# Patient Record
Sex: FEMALE | Race: OTHER | Employment: UNEMPLOYED | URBAN - METROPOLITAN AREA
[De-identification: names, ages, dates, MRNs, and addresses within clinical notes are randomized per-mention and may not be internally consistent; named-entity substitution may affect disease eponyms.]

---

## 2024-03-13 ENCOUNTER — TELEPHONE (OUTPATIENT)
Age: 29
End: 2024-03-13

## 2024-03-13 LAB
ABO, EXTERNAL RESULT: NORMAL
HEPATITIS C ANTIBODY, EXTERNAL RESULT: NORMAL
RH FACTOR, EXTERNAL RESULT: POSITIVE
RUBELLA TITER, EXTERNAL RESULT: NORMAL

## 2024-03-13 NOTE — TELEPHONE ENCOUNTER
Received an referral to have to pt seen at [facility] at 20 weeks. I scheduled pt on 05/23/2024 @ 8:00. Called pt via  to confirm appointment and she did not answer, no vm could be left bc it stated that the number was \"not in service\". We called the boyfriend and we could not get I contact with him either. The patient does not have an active mychart.  Called Dr Tierney's office to inform them that I was not able to get in contact with the patient. I called Dr Tierney's office and spoke to Jerry, she stated that we had the same number and she would note to inform the pt about over appointment when she comes in to see them in April.

## 2024-04-10 ENCOUNTER — ROUTINE PRENATAL (OUTPATIENT)
Age: 29
End: 2024-04-10
Payer: COMMERCIAL

## 2024-04-10 ENCOUNTER — OFFICE VISIT (OUTPATIENT)
Age: 29
End: 2024-04-10

## 2024-04-10 VITALS
DIASTOLIC BLOOD PRESSURE: 60 MMHG | SYSTOLIC BLOOD PRESSURE: 94 MMHG | HEIGHT: 59 IN | BODY MASS INDEX: 33.14 KG/M2 | WEIGHT: 164.4 LBS | TEMPERATURE: 98.2 F | OXYGEN SATURATION: 98 % | HEART RATE: 85 BPM

## 2024-04-10 DIAGNOSIS — F32.A DEPRESSION, UNSPECIFIED DEPRESSION TYPE: ICD-10-CM

## 2024-04-10 DIAGNOSIS — Z3A.13 13 WEEKS GESTATION OF PREGNANCY: Primary | ICD-10-CM

## 2024-04-10 DIAGNOSIS — Z13.31 POSITIVE DEPRESSION SCREENING: Primary | ICD-10-CM

## 2024-04-10 DIAGNOSIS — R30.0 DYSURIA: ICD-10-CM

## 2024-04-10 LAB
BILIRUBIN, URINE, POC: NEGATIVE
BLOOD URINE, POC: NORMAL
GLUCOSE URINE, POC: NEGATIVE
KETONES, URINE, POC: NEGATIVE
LEUKOCYTE ESTERASE, URINE, POC: NORMAL
NITRITE, URINE, POC: NEGATIVE
PH, URINE, POC: 7 (ref 4.6–8)
PROTEIN,URINE, POC: NEGATIVE
SPECIFIC GRAVITY, URINE, POC: 1.02 (ref 1–1.03)
URINALYSIS CLARITY, POC: NORMAL
URINALYSIS COLOR, POC: YELLOW
UROBILINOGEN, POC: NORMAL

## 2024-04-10 PROCEDURE — 81003 URINALYSIS AUTO W/O SCOPE: CPT

## 2024-04-10 RX ORDER — ASPIRIN 81 MG/1
81 TABLET ORAL DAILY
Qty: 90 TABLET | Refills: 0 | Status: SHIPPED | OUTPATIENT
Start: 2024-04-10

## 2024-04-10 ASSESSMENT — PATIENT HEALTH QUESTIONNAIRE - PHQ9
SUM OF ALL RESPONSES TO PHQ QUESTIONS 1-9: 24
SUM OF ALL RESPONSES TO PHQ QUESTIONS 1-9: 21
6. FEELING BAD ABOUT YOURSELF - OR THAT YOU ARE A FAILURE OR HAVE LET YOURSELF OR YOUR FAMILY DOWN: NEARLY EVERY DAY
SUM OF ALL RESPONSES TO PHQ9 QUESTIONS 1 & 2: 6
5. POOR APPETITE OR OVEREATING: NEARLY EVERY DAY
SUM OF ALL RESPONSES TO PHQ QUESTIONS 1-9: 24
1. LITTLE INTEREST OR PLEASURE IN DOING THINGS: NEARLY EVERY DAY
10. IF YOU CHECKED OFF ANY PROBLEMS, HOW DIFFICULT HAVE THESE PROBLEMS MADE IT FOR YOU TO DO YOUR WORK, TAKE CARE OF THINGS AT HOME, OR GET ALONG WITH OTHER PEOPLE: EXTREMELY DIFFICULT
7. TROUBLE CONCENTRATING ON THINGS, SUCH AS READING THE NEWSPAPER OR WATCHING TELEVISION: NEARLY EVERY DAY
4. FEELING TIRED OR HAVING LITTLE ENERGY: NEARLY EVERY DAY
SUM OF ALL RESPONSES TO PHQ QUESTIONS 1-9: 24
2. FEELING DOWN, DEPRESSED OR HOPELESS: NEARLY EVERY DAY
9. THOUGHTS THAT YOU WOULD BE BETTER OFF DEAD, OR OF HURTING YOURSELF: NEARLY EVERY DAY
8. MOVING OR SPEAKING SO SLOWLY THAT OTHER PEOPLE COULD HAVE NOTICED. OR THE OPPOSITE, BEING SO FIGETY OR RESTLESS THAT YOU HAVE BEEN MOVING AROUND A LOT MORE THAN USUAL: NEARLY EVERY DAY

## 2024-04-10 NOTE — PROGRESS NOTES
I discussed with the resident the medical history and the resident's findings on physical exam. I discussed with the resident the patient's diagnosis and agree with the plan of care.     Remote hx of spotting - pink with wiping.  Has resolved/not having today.      27yo  at 13w6d by --/    IUP: RH pos, MFM scheduled   Obesity: A1C and 1hr GT, ASA  Depression: starting Zoloft, giving psych resources including Crisis hotline   Hx spotting: will check UA    Meet with CM/SW for IOB  Estimated Date of Delivery: None noted.    
Jaylene Tobin is a 28 y.o. female      Chief Complaint   Patient presents with    Routine Prenatal Visit     13w 6d.  2 weeks ago vaginal bleeding and pain which has resolved. Pain in lower belly and hips but no bleeding for 1 week.  Discharge describing as like \"beginning of period\".  Headaches and stressed and concerned about depression.       \"Have you been to the ER, urgent care clinic since your last visit?  Hospitalized since your last visit?\"    NO    “Have you seen or consulted any other health care providers outside of [facility] since your last visit?”    NO            Click Here for Release of Records Request    Vitals:    04/10/24 0920   BP: 94/60   Site: Right Upper Arm   Position: Sitting   Cuff Size: Medium Adult   Pulse: 85   Temp: 98.2 °F (36.8 °C)   TempSrc: Oral   SpO2: 98%   Weight: 74.6 kg (164 lb 6.4 oz)   Height: 1.5 m (4' 11.06\")           Medication Reconciliation Completed, changes notes. Please Update medication list.  
patient has received an after-visit summary and questions were answered concerning future plans.  I have discussed medication side effects and warnings with the patient as well. Informed pt to return to the office or go to the ER if she experiences vaginal bleeding, vaginal discharge, leaking of fluid, pelvic cramping.    Pt discussed with Dr. Jaramillo (attending physician)    Jaswant Jimenes MD  Family Medicine Resident

## 2024-04-10 NOTE — PROGRESS NOTES
SW follow-up visit. Patient with continued depression. Hx of depression about 1 year ago, severe depression, was seeing a therapist in [place] and placed on medication (patient does not recollect name), per patient medication did not help much. Patient states no desire to do anything, no strength, nervous, phillips, and angry all the time.     SW Navigator reviewed some coping techniques with patient including deep breathing exercises and mindfulness techniques. Handout provided during visit of this resource.     Provided a list of behavioral health resources compiled by this  navigator, including emergency numbers, suicide hotline numbers, and local providers for psychiatry and counseling.     Depression re-screen:  PHQ2 = 6  PHQ9 = 24    Behavioral Health Focused Assessment  Any symptoms of Anxiety:  Chest pain? yes  Shortness of breath? yes  Racing pulse/Increased heart rate? yes  Any symptoms of Depression:  Suicidal thoughts or feelings of hurting yourself? yes  Feelings of hopelessness? yes  Increased fatigue or loss of energy? yes  Loss of appetite? yes  Sleeping too much? Sometimes it varies by day  Possible Manic Symptoms:  Racing thoughts? yes  Impulsivity, like buying sprees? no  Unusual talkativeness? no  Decreased need for sleep? no  Increased energy? no  Other BH Symptoms:  Decreased ability to think or concentrate? yes  If yes, is this affecting your daily activities? yes  Auditory and Visual hallucinations? no  Delusional thinking? no  Homicidal thoughts or aggressive thoughts/actions towards others, no  Any issues with alcohol or drug abuse? no    Assessment:  Positive depression screening    Plan:    Continue with physician recommendation, schedule appointment with counselor/therapist (list provided), and ongoing psychosocial support by SW and resource referral, as desired by the patient.       VIVIAN Alfaro Navigator

## 2024-04-10 NOTE — PATIENT INSTRUCTIONS
Patient Education        Recuperación de la depresión: Instrucciones de cuidado  Recovering From Depression: Care Instructions  Instrucciones de cuidado     Tener un buen cuidado de usted mismo es importante a medida que se recupera de la depresión. Con el tiempo, a medida que el tratamiento funcione kathleen síntomas desaparecerán. No lo abandone. Al contrario, concentre de la torre energía en recuperarse.  De [place] estado de ánimo mejorará. Solo tomará un tiempo. Concéntrese en cosas que le pueden ayudar a sentirse mejor, lupe estar con amigos o familiares, comer jose cruz y descansar lo suficiente. Sada tómese las cosas con tranquilidad. No maria a muchas actividades demasiado pronto. Comenzará a sentirse mejor poco a poco.  La atención de seguimiento es mena parte clave de de la torre tratamiento y seguridad. Asegúrese de hacer y acudir a todas las citas, y llame a de la torre médico si está teniendo problemas. También es mena buena idea saber los resultados de kathleen exámenes y mantener mena lista de los medicamentos que bonnie.  ¿Cómo puede cuidarse en el hogar?  Sea realista  Si debe hacer mena tarea que le llevará mucho tiempo, divídala en varias etapas pequeñas que usted pueda manejar y maria a solo lo que pueda.  Es posible que quiera posponer las decisiones importantes hasta que se haya recuperado de la depresión. Si tiene planes que tendrán un gran impacto en de la torre nadja, lupe casarse, divorciarse o cambiar de trabajo, intente esperar un poco. Háblelo con kathleen amigos y seres queridos, quienes pueden ayudarle a analizar el panorama completo.  Es importante acercarse a las personas para pedirles ayuda. No se aísle. Deje que de la torre tao y amigos le ayuden. Encuentre a alguien en quien pueda confiar y hable con monster persona.  Sea paciente y bondadoso consigo mismo. Recuerde que la depresión no es de la torre culpa y no es algo que usted pueda superar solo con fuerza de voluntad. El tratamiento es importante para la depresión, al igual que para cualquier otra enfermedad. Sentirse

## 2024-04-17 LAB
Lab: NEGATIVE
Lab: NORMAL
NTRA ALPHA-THALASSEMIA: NEGATIVE
NTRA BETA-HEMOGLOBINOPATHIES: NEGATIVE
NTRA CANAVAN DISEASE: NEGATIVE
NTRA CYSTIC FIBROSIS: NEGATIVE
NTRA DUCHENNE/BECKER MUSCULAR DYSTROPHY: NEGATIVE
NTRA FAMILIAL DYSAUTONOMIA: NEGATIVE
NTRA FRAGILE X SYNDROME: NEGATIVE
NTRA GALACTOSEMIA: NEGATIVE
NTRA GAUCHER DISEASE: NEGATIVE
NTRA MEDIUM CHAIN ACYL-COA DEHYDROGENASE DEFICIENCY: NEGATIVE
NTRA POLYCYSTIC KIDNEY DISEASE, AUTOSOMAL RECESSIVE: NEGATIVE
NTRA SMITH-LEMLI-OPITZ SYNDROME: NEGATIVE
NTRA SPINAL MUSCULAR ATROPHY: NEGATIVE
NTRA TAY-SACHS DISEASE: NEGATIVE

## 2024-05-08 ENCOUNTER — ROUTINE PRENATAL (OUTPATIENT)
Age: 29
End: 2024-05-08
Payer: COMMERCIAL

## 2024-05-08 VITALS
DIASTOLIC BLOOD PRESSURE: 71 MMHG | WEIGHT: 167.2 LBS | OXYGEN SATURATION: 97 % | BODY MASS INDEX: 33.71 KG/M2 | HEIGHT: 59 IN | TEMPERATURE: 98 F | HEART RATE: 90 BPM | SYSTOLIC BLOOD PRESSURE: 107 MMHG | RESPIRATION RATE: 18 BRPM

## 2024-05-08 DIAGNOSIS — Z34.90 PREGNANCY, UNSPECIFIED GESTATIONAL AGE: Primary | ICD-10-CM

## 2024-05-08 PROCEDURE — 0502F SUBSEQUENT PRENATAL CARE: CPT

## 2024-05-08 PROCEDURE — 90746 HEPB VACCINE 3 DOSE ADULT IM: CPT

## 2024-05-08 ASSESSMENT — PATIENT HEALTH QUESTIONNAIRE - PHQ9
2. FEELING DOWN, DEPRESSED OR HOPELESS: NOT AT ALL
SUM OF ALL RESPONSES TO PHQ9 QUESTIONS 1 & 2: 0
SUM OF ALL RESPONSES TO PHQ QUESTIONS 1-9: 0
SUM OF ALL RESPONSES TO PHQ QUESTIONS 1-9: 0
1. LITTLE INTEREST OR PLEASURE IN DOING THINGS: NOT AT ALL
SUM OF ALL RESPONSES TO PHQ QUESTIONS 1-9: 0
SUM OF ALL RESPONSES TO PHQ QUESTIONS 1-9: 0

## 2024-05-08 NOTE — PROGRESS NOTES
Chief Complaint   Patient presents with    Routine Prenatal Visit     Vaginal bleeding: no  Vaginal fluid: no  Contractions: no  Concerns: no     Vitals:    05/08/24 0853   BP: 107/71   Site: Right Upper Arm   Position: Sitting   Cuff Size: Medium Adult   Pulse: 90   Resp: 18   Temp: 98 °F (36.7 °C)   TempSrc: Temporal   SpO2: 97%   Weight: 75.8 kg (167 lb 3.2 oz)   Height: 1.5 m (4' 11.06\")     \"Have you been to the ER, urgent care clinic since your last visit?  Hospitalized since your last visit?\"    NO    “Have you seen or consulted any other health care providers outside of [facility] since your last visit?”    NO            Click Here for Release of Records Request

## 2024-05-08 NOTE — PROGRESS NOTES
[address] [place] [address]   Office [phone], Fax [phone]    Return OB Visit     Subjective:    Jaylene Tobin 28 y.o.  at GA: 17w6d , PATTI: Estimated Date of Delivery: 10/10/24 by LMP and confirmed by 9w US.    Doing well with no concerns today. No SI. Doing much better on zoloft. Asking about gender of baby.    Discharge: no  LOF: no  Vaginal bleeding: as above  Contractions: no  Taking prenatal vitamins: yes  Taking ASA:  yes    Allergies- reviewed:   No Known Allergies  Medications- reviewed:   Current Outpatient Medications   Medication Sig    aspirin 81 MG EC tablet Take 1 tablet by mouth daily    sertraline (ZOLOFT) 50 MG tablet Take 1 tablet by mouth daily    Prenatal Vit-Fe Fumarate-FA (PRENATAL VITAMINS) 28-0.8 MG TABS Take by mouth    doxylamine-pyridoxine 10-10 MG TBEC Take 10 mg by mouth daily (Patient not taking: Reported on 3/13/2024)     No current facility-administered medications for this visit.     Past Medical History- reviewed:  No past medical history on file.  Past Surgical History- reviewed:   No past surgical history on file.  Social History- reviewed:  Social History     Socioeconomic History    Marital status: Single     Spouse name: Not on file    Number of children: Not on file    Years of education: Not on file    Highest education level: Not on file   Occupational History    Not on file   Tobacco Use    Smoking status: Never    Smokeless tobacco: Never   Substance and Sexual Activity    Alcohol use: Never    Drug use: Never    Sexual activity: Not on file   Other Topics Concern    Not on file   Social History Narrative    Not on file     Social Determinants of Health     Financial Resource Strain: Patient Declined (3/13/2024)    Overall Financial Resource Strain (CARDIA)     Difficulty of Paying Living Expenses: Patient declined   Recent Concern: Financial Resource Strain - Medium Risk (2024)    Overall Financial Resource Strain (CARDIA)

## 2024-05-08 NOTE — PROGRESS NOTES
I reviewed with the resident the medical history and the resident's findings on the physical examination.  I discussed with the resident the patient's diagnosis and concur with the plan, with the following additions:     28 y.o.   at 17w6d by 9 week US here for KARRIE    BP Readings from Last 3 Encounters:   04/10/24 94/60   24 111/69   24 107/70       Wt Readings from Last 10 Encounters:   04/10/24 74.6 kg (164 lb 6.4 oz)   24 76.9 kg (169 lb 9.6 oz)   24 70.3 kg (155 lb)          #KARRIE:   - Labs reviewed - Rh+ and ab screen neg; Hep B Ag neg and nonimmune; rubella immune; varicella immune; HIV and syphilis NR, GC/Chlaymydia neg; early GTT passed; GTT @ 24-28wks; TSH not done; Ucx no growth; NIPT and Carrier Screening low risk; Hemoglobin evaluation wnl. GBS to be collected within 5 weeks of anticipated delivery.  - Last Pap 3/2024, NILM  - Sonos reviewed. anatomy sono scheduled.   - Immunizations:; Tdap at 28; Hep B indicated  - Indications for Aspirin: obesity, SES  - Taking PNV.  - Anticipated mode of delivery: vaignal  - Contraception plan: TBD    #Depression  - started zoloft last visit and given resources.  - today is doing much better per pt.  - cont Zoloft    #Obesity  - Passed early GTT  - ASA  - counseled healthy weight gain in pregnancy.  - 3rd tri growth.    #HBV NI  - Vaccine 1 of 3 today, and at 1 and 6 mo.      Today: remind of anatomy appt, check in on mood, HBV Vax 1 of 3  Next visit: review anatomy.        Follow up in 4 weeks.  Scheduled for 6/3/2024        30 minutes were spent on the day of this encounter both with the patient and in related activities including chart review, care coordination and counseling.        Kane Briseno MD, MPH  [facility]

## 2024-05-22 ENCOUNTER — TELEPHONE (OUTPATIENT)
Age: 29
End: 2024-05-22

## 2024-05-22 NOTE — TELEPHONE ENCOUNTER
Called pt to confirm appt for 05/23 @ 8:00 via  ID # [id number]. Pt did not answer when the number on file was called and we could not leave a vm. Called boyfriend and he confirmed that the pt will be at her appt on tomorrow

## 2024-05-23 ENCOUNTER — ROUTINE PRENATAL (OUTPATIENT)
Age: 29
End: 2024-05-23
Payer: COMMERCIAL

## 2024-05-23 VITALS — SYSTOLIC BLOOD PRESSURE: 105 MMHG | DIASTOLIC BLOOD PRESSURE: 66 MMHG | HEART RATE: 94 BPM

## 2024-05-23 DIAGNOSIS — E66.9 OBESITY (BMI 30-39.9): ICD-10-CM

## 2024-05-23 DIAGNOSIS — Z3A.20 20 WEEKS GESTATION OF PREGNANCY: ICD-10-CM

## 2024-05-23 DIAGNOSIS — O99.212 OBESITY AFFECTING PREGNANCY IN SECOND TRIMESTER, UNSPECIFIED OBESITY TYPE: Primary | ICD-10-CM

## 2024-05-23 PROCEDURE — 76817 TRANSVAGINAL US OBSTETRIC: CPT | Performed by: OBSTETRICS & GYNECOLOGY

## 2024-05-23 PROCEDURE — 76811 OB US DETAILED SNGL FETUS: CPT | Performed by: OBSTETRICS & GYNECOLOGY

## 2024-05-23 PROCEDURE — 99024 POSTOP FOLLOW-UP VISIT: CPT | Performed by: OBSTETRICS & GYNECOLOGY

## 2024-05-23 NOTE — PROCEDURES
PATIENT: BRIAN CUNHA   -  : 1995   -  DOS:2024   -  INTERPRETING PROVIDER:Paolo Abad,   Indication  ========    Anatomy, Obesity    Method  ======    Transabdominal and transvaginal ultrasound examination. View: Suboptimal view: limited by maternal body habitus and fetal position    Dating  ======    LMP on: 2024  GA by LMP 19 w + 0 d  PATTI by LMP: 10/17/2024  Previous Ultrasound on: 3/13/2024  Type of prior assessment: GA  GA at prior assessment date 9 w + 6 d  GA by previous U/S 20 w + 0 d  PATTI by previous Ultrasound: 10/10/2024  Ultrasound examination on: 2024  GA by U/S based upon: AC, BPD, Femur, HC  GA by U/S 20 w + 1 d  PATTI by U/S: 10/9/2024  Assigned: based on ultrasound (GA), selected on 2024  Assigned GA 20 w + 0 d  Assigned PATTI: 10/10/2024    Fetal Growth Overview  =================    Exam date        GA              BPD (mm)          HC (mm)              AC (mm)            FL (mm)             HL (mm)        EFW (g)  2024        20w 0d        43.3     16%        169.5    23%        159     77%        33.8    64%                             367    80%    Fetal Biometry  ============    Standard  BPD 43.3 mm 19w 1d 16% Hadlock  OFD 63.0 mm 21w 4d 92% Arlene  .5 mm 19w 4d 23% Hadlock  Cerebellum tr 20.8 mm 19w 6d 70% Hill  Nuchal fold 5.3 mm  .0 mm 21w 0d 77% Hadlock  Femur 33.8 mm 20w 4d 64% Hadlock   g 20w 4d 80% Hadlock  EFW (lb) 0 lb  EFW (oz) 13 oz  EFW by: Hadlock (BPD-HC-AC-FL)  Extended   5.7 mm  CM 5.1 mm  55% Nicolaides  Nasal bone 6.7 mm  Head / Face / Neck  Nasal bone: present  Other Structures   bpm    General Evaluation  ==============    Cardiac activity present.  bpm. Fetal movements: visualized. Presentation: BREECH  Placenta: Placental site: posterior, appropriate distance from the internal os. Placental edge-to-cervical os distance 2.7 cm  Umbilical cord: Cord vessels: 3 vessel cord. Insertion site:

## 2024-06-03 ENCOUNTER — ROUTINE PRENATAL (OUTPATIENT)
Age: 29
End: 2024-06-03

## 2024-06-03 ENCOUNTER — OFFICE VISIT (OUTPATIENT)
Age: 29
End: 2024-06-03

## 2024-06-03 VITALS
WEIGHT: 175.8 LBS | DIASTOLIC BLOOD PRESSURE: 62 MMHG | HEART RATE: 86 BPM | TEMPERATURE: 98.2 F | BODY MASS INDEX: 35.44 KG/M2 | OXYGEN SATURATION: 97 % | SYSTOLIC BLOOD PRESSURE: 108 MMHG

## 2024-06-03 DIAGNOSIS — F32.A DEPRESSION, UNSPECIFIED DEPRESSION TYPE: ICD-10-CM

## 2024-06-03 DIAGNOSIS — Z3A.21 21 WEEKS GESTATION OF PREGNANCY: Primary | ICD-10-CM

## 2024-06-03 DIAGNOSIS — Z78.9 NEED FOR FOLLOW-UP BY SOCIAL WORKER: Primary | ICD-10-CM

## 2024-06-03 PROCEDURE — 0502F SUBSEQUENT PRENATAL CARE: CPT

## 2024-06-03 RX ORDER — ASPIRIN 81 MG/1
81 TABLET ORAL DAILY
Qty: 90 TABLET | Refills: 0 | Status: SHIPPED | OUTPATIENT
Start: 2024-06-03

## 2024-06-03 NOTE — PROGRESS NOTES
I discussed with the resident the medical history and the resident's findings on physical exam. I discussed with the resident the patient's diagnosis and agree with the plan of care.     29yo  at 21w4d by --/9    IUP: RH pos  low risk NIPT, Horizon 14 neg  anatomy okay but suboptimal views, f/up scheduled    Obesity: A1C 4.9, early GTT okay  on ASA   Depression/Anxiety: increase to Zoloft 100mg daily  has met with SHAHEEN/ANJANA, encouraged to reach out to therapist   Hx spotting: resolved   Hep B Non-Immune:      Estimated Date of Delivery: 10/10/24

## 2024-06-03 NOTE — PROGRESS NOTES
Patient seen by SHAHEEN Navigator for follow-up visit.    Patient with  depression/anxiety. Patient currently in her 21 weeks of pregnancy. She reports that she is still having difficulty. Re screened today by physician; PHQ9=18 today, higher than previous screening (score of 10). Medication dosage has been increased.    Patient has agreed to have counseling; care coordination established by SHAHEEN. Appointment scheduled for counseling services with [facility]. Scheduled for 24 at 12pm.    Patient also reports some difficulty obtaining food resources in community. Per patient has tried calling but no response. SW reviewed resources including food bank dates and location of distribution.     Patient had questions about her medical bill. SW reviewed bill in question, billing office sent claim to Medicaid for payment. No balance for patient at this time.    Plan:  Ongoing psychosocial support and resource referral, as desired by the patient and family.      VIVIAN Alfaro   Navigator

## 2024-06-03 NOTE — PROGRESS NOTES
Return OB Visit       Subjective:   Jaylene Tobin 28 y.o.   PATTI: 10/10/2024, by Ultrasound  GA:  21w4d.      LOF: No  Vaginal bleeding: No  Fetal movement (after 20 weeks): Unsure if she feels baby move, does feel an occasional pulse  Contractions: No  Prenatal vitamins: Yes    Pt denies fever, chills, HA, vision disturbances, RUQ pain, chest pain, SOB, N/V, urinary problems, foul smelling vaginal discharge.    Allergies- reviewed:   No Known Allergies  Medications- reviewed:   Current Outpatient Medications   Medication Sig    aspirin 81 MG EC tablet Take 1 tablet by mouth daily    sertraline (ZOLOFT) 50 MG tablet Take 2 tablets by mouth daily    Prenatal Vit-Fe Fumarate-FA (PRENATAL VITAMINS) 28-0.8 MG TABS Take by mouth    doxylamine-pyridoxine 10-10 MG TBEC Take 10 mg by mouth daily (Patient not taking: Reported on 3/13/2024)     No current facility-administered medications for this visit.     Past Medical History- reviewed:  No past medical history on file.  Past Surgical History- reviewed:   No past surgical history on file.  Social History- reviewed:  Social History     Socioeconomic History    Marital status: Single     Spouse name: Not on file    Number of children: Not on file    Years of education: Not on file    Highest education level: Not on file   Occupational History    Not on file   Tobacco Use    Smoking status: Never    Smokeless tobacco: Never   Substance and Sexual Activity    Alcohol use: Never    Drug use: Never    Sexual activity: Not on file   Other Topics Concern    Not on file   Social History Narrative    Not on file     Social Determinants of Health     Financial Resource Strain: Patient Declined (3/13/2024)    Overall Financial Resource Strain (CARDIA)     Difficulty of Paying Living Expenses: Patient declined   Recent Concern: Financial Resource Strain - Medium Risk (2024)    Overall Financial Resource Strain (CARDIA)     Difficulty of Paying Living Expenses: Somewhat

## 2024-06-03 NOTE — PROGRESS NOTES
Patient is 21w4d    Taking prenatal vitamins: yes    Leakage of fluid: no  Vaginal bleeding: no  Feeling baby move if over 20 weeks: yes  Contractions: no  Pain: 6/10    Identified pt with two pt identifiers(name and ). Reviewed record in preparation for visit and have obtained necessary documentation.  Chief Complaint   Patient presents with    Routine Prenatal Visit     C/o lower back , vaginal pain, abdominal cramps         Vitals:    24 0819   BP: 108/62   Pulse: 86   Temp: 98.2 °F (36.8 °C)   TempSrc: Oral   SpO2: 97%   Weight: 79.7 kg (175 lb 12.8 oz)         Coordination of Care Questionnaire:  :     \"Have you been to the ER, urgent care clinic since your last visit?  Hospitalized since your last visit?\"    NO    “Have you seen or consulted any other health care providers outside of [facility] since your last visit?”    NO            Click Here for Release of Records Request '

## 2024-06-19 NOTE — PROGRESS NOTES
[address] [place] [address]   Office [phone], Fax [phone]    Return OB Visit       Subjective:   Jaylene Tobin 28 y.o.  at GA:  23w6d, PATTI: Estimated Date of Delivery: 10/10/24 by LMP and confirmed by 9w US.    Patient reports feeling well. No new concerns at this time. Patient denies headache, visual disturbances, CP, SOB, RUQ pain, dysuria, and calf tenderness.     LOF: no  Vaginal bleeding:no  Fetal movement (after 20 weeks): yes  Contractions: no  Taking prenatal vitamins:yes  Taking ASA: yes    Insomnia: takes 2 hours to fall asleep. Interested in medication to try to help.       Allergies- reviewed:   No Known Allergies  Medications- reviewed:   Current Outpatient Medications   Medication Sig    aspirin 81 MG EC tablet Take 1 tablet by mouth daily    sertraline (ZOLOFT) 50 MG tablet Take 2 tablets by mouth daily    Prenatal Vit-Fe Fumarate-FA (PRENATAL VITAMINS) 28-0.8 MG TABS Take by mouth    doxylamine-pyridoxine 10-10 MG TBEC Take 10 mg by mouth daily (Patient not taking: Reported on 3/13/2024)     No current facility-administered medications for this visit.     Past Medical History- reviewed:  No past medical history on file.  Past Surgical History- reviewed:   No past surgical history on file.  Social History- reviewed:  Social History     Socioeconomic History    Marital status: Single     Spouse name: Not on file    Number of children: Not on file    Years of education: Not on file    Highest education level: Not on file   Occupational History    Not on file   Tobacco Use    Smoking status: Never    Smokeless tobacco: Never   Substance and Sexual Activity    Alcohol use: Never    Drug use: Never    Sexual activity: Not on file   Other Topics Concern    Not on file   Social History Narrative    Not on file     Social Determinants of Health     Financial Resource Strain: Patient Declined (3/13/2024)    Overall Financial Resource Strain (CARDIA)     Difficulty of

## 2024-06-21 ENCOUNTER — TELEPHONE (OUTPATIENT)
Age: 29
End: 2024-06-21

## 2024-06-21 NOTE — TELEPHONE ENCOUNTER
Called language  line session [id number]    Called patient and confirmed name and     Confirmed details of appointment scheduled on 24 @ 9:45 at the [facility]. Asked patient to arrive 10 min early and reminded her that she may bring two guest over the age of 12 with her.     Patient understood all details of appt.

## 2024-06-23 NOTE — PROGRESS NOTES
[id number] [place] [address]   Office [phone], Fax [phone]    Return OB Visit   Due to a language barrier and  was used for this visit: [facility]  #[id number].   Subjective:   Jaylene Tobin 28 y.o.  at GA:  24w4d, PATTI: Estimated Date of Delivery: 10/10/24 by 1st trimester US at 9w.     Pregnancy complicated by depression/anxiety, vaginal bleeding, obesity, hep B nonimmune.    LOF: NO  Vaginal bleeding: NO  Fetal movement (after 20 weeks): YES  Contractions: NO  Taking prenatal vitamins:YES  Taking ASA: YES    Depression:   - a lot family problems which are causing stress for her recently  - denies SI/HI  - seeing counselor every week - which she feels is very helpful  - taking zoloft 100mg daily and noticed improvement but would like to switch if possible       Allergies- reviewed:   No Known Allergies  Medications- reviewed:   Current Outpatient Medications   Medication Sig    aspirin 81 MG EC tablet Take 1 tablet by mouth daily    sertraline (ZOLOFT) 50 MG tablet Take 2 tablets by mouth daily    Prenatal Vit-Fe Fumarate-FA (PRENATAL VITAMINS) 28-0.8 MG TABS Take by mouth    doxylamine-pyridoxine 10-10 MG TBEC Take 10 mg by mouth daily (Patient not taking: Reported on 3/13/2024)     No current facility-administered medications for this visit.     Past Medical History- reviewed:  No past medical history on file.  Past Surgical History- reviewed:   No past surgical history on file.  Social History- reviewed:  Social History     Socioeconomic History    Marital status: Single     Spouse name: Not on file    Number of children: Not on file    Years of education: Not on file    Highest education level: Not on file   Occupational History    Not on file   Tobacco Use    Smoking status: Never    Smokeless tobacco: Never   Substance and Sexual Activity    Alcohol use: Never    Drug use: Never    Sexual activity: Not on file   Other

## 2024-06-23 NOTE — PATIENT INSTRUCTIONS
National Crisis Hotlines  - Call the Suicide and Crisis Lifeline at 988.  - Call 7-879-150-TALK (1-629.919.1894).  - Text HOME to 073562 to access the Crisis Text Line.    [facility]   Crisis Intervention - Available 24/7: [phone]  Address: [facility] [place] [address] [place] [address]   Office number: [phone]  Crisis Intervention - Available 24/7: [phone]  Address: [address] [place] [id number]

## 2024-06-24 ENCOUNTER — ROUTINE PRENATAL (OUTPATIENT)
Age: 29
End: 2024-06-24
Payer: COMMERCIAL

## 2024-06-24 VITALS — SYSTOLIC BLOOD PRESSURE: 99 MMHG | DIASTOLIC BLOOD PRESSURE: 65 MMHG | HEART RATE: 94 BPM

## 2024-06-24 VITALS
RESPIRATION RATE: 14 BRPM | HEART RATE: 96 BPM | OXYGEN SATURATION: 96 % | HEIGHT: 59 IN | WEIGHT: 181.4 LBS | SYSTOLIC BLOOD PRESSURE: 90 MMHG | TEMPERATURE: 98.3 F | DIASTOLIC BLOOD PRESSURE: 58 MMHG | BODY MASS INDEX: 36.57 KG/M2

## 2024-06-24 DIAGNOSIS — Z23 ENCOUNTER FOR IMMUNIZATION: ICD-10-CM

## 2024-06-24 DIAGNOSIS — F32.A DEPRESSION, UNSPECIFIED DEPRESSION TYPE: ICD-10-CM

## 2024-06-24 DIAGNOSIS — Z3A.24 24 WEEKS GESTATION OF PREGNANCY: Primary | ICD-10-CM

## 2024-06-24 DIAGNOSIS — E66.9 OBESITY (BMI 30-39.9): Primary | ICD-10-CM

## 2024-06-24 PROCEDURE — PBSHW HEP B, ENGERIX-B, (AGE 20 YRS+), IM, 1ML, 3-DOSE: Performed by: STUDENT IN AN ORGANIZED HEALTH CARE EDUCATION/TRAINING PROGRAM

## 2024-06-24 PROCEDURE — 90746 HEPB VACCINE 3 DOSE ADULT IM: CPT | Performed by: STUDENT IN AN ORGANIZED HEALTH CARE EDUCATION/TRAINING PROGRAM

## 2024-06-24 PROCEDURE — 76816 OB US FOLLOW-UP PER FETUS: CPT | Performed by: OBSTETRICS & GYNECOLOGY

## 2024-06-24 PROCEDURE — 0502F SUBSEQUENT PRENATAL CARE: CPT | Performed by: STUDENT IN AN ORGANIZED HEALTH CARE EDUCATION/TRAINING PROGRAM

## 2024-06-24 ASSESSMENT — ANXIETY QUESTIONNAIRES
5. BEING SO RESTLESS THAT IT IS HARD TO SIT STILL: SEVERAL DAYS
IF YOU CHECKED OFF ANY PROBLEMS ON THIS QUESTIONNAIRE, HOW DIFFICULT HAVE THESE PROBLEMS MADE IT FOR YOU TO DO YOUR WORK, TAKE CARE OF THINGS AT HOME, OR GET ALONG WITH OTHER PEOPLE: NOT DIFFICULT AT ALL
3. WORRYING TOO MUCH ABOUT DIFFERENT THINGS: NEARLY EVERY DAY
6. BECOMING EASILY ANNOYED OR IRRITABLE: MORE THAN HALF THE DAYS
1. FEELING NERVOUS, ANXIOUS, OR ON EDGE: NEARLY EVERY DAY
GAD7 TOTAL SCORE: 18
2. NOT BEING ABLE TO STOP OR CONTROL WORRYING: NEARLY EVERY DAY
4. TROUBLE RELAXING: NEARLY EVERY DAY
7. FEELING AFRAID AS IF SOMETHING AWFUL MIGHT HAPPEN: NEARLY EVERY DAY

## 2024-06-24 ASSESSMENT — PATIENT HEALTH QUESTIONNAIRE - PHQ9
SUM OF ALL RESPONSES TO PHQ QUESTIONS 1-9: 21
SUM OF ALL RESPONSES TO PHQ QUESTIONS 1-9: 21
3. TROUBLE FALLING OR STAYING ASLEEP: NEARLY EVERY DAY
9. THOUGHTS THAT YOU WOULD BE BETTER OFF DEAD, OR OF HURTING YOURSELF: NOT AT ALL
SUM OF ALL RESPONSES TO PHQ QUESTIONS 1-9: 21
2. FEELING DOWN, DEPRESSED OR HOPELESS: NEARLY EVERY DAY
4. FEELING TIRED OR HAVING LITTLE ENERGY: NEARLY EVERY DAY
10. IF YOU CHECKED OFF ANY PROBLEMS, HOW DIFFICULT HAVE THESE PROBLEMS MADE IT FOR YOU TO DO YOUR WORK, TAKE CARE OF THINGS AT HOME, OR GET ALONG WITH OTHER PEOPLE: NOT DIFFICULT AT ALL
7. TROUBLE CONCENTRATING ON THINGS, SUCH AS READING THE NEWSPAPER OR WATCHING TELEVISION: NEARLY EVERY DAY
8. MOVING OR SPEAKING SO SLOWLY THAT OTHER PEOPLE COULD HAVE NOTICED. OR THE OPPOSITE, BEING SO FIGETY OR RESTLESS THAT YOU HAVE BEEN MOVING AROUND A LOT MORE THAN USUAL: NEARLY EVERY DAY
5. POOR APPETITE OR OVEREATING: MORE THAN HALF THE DAYS
SUM OF ALL RESPONSES TO PHQ9 QUESTIONS 1 & 2: 4
6. FEELING BAD ABOUT YOURSELF - OR THAT YOU ARE A FAILURE OR HAVE LET YOURSELF OR YOUR FAMILY DOWN: NEARLY EVERY DAY
SUM OF ALL RESPONSES TO PHQ QUESTIONS 1-9: 21
1. LITTLE INTEREST OR PLEASURE IN DOING THINGS: SEVERAL DAYS

## 2024-06-24 ASSESSMENT — COLUMBIA-SUICIDE SEVERITY RATING SCALE - C-SSRS
6. HAVE YOU EVER DONE ANYTHING, STARTED TO DO ANYTHING, OR PREPARED TO DO ANYTHING TO END YOUR LIFE?: NO
1. WITHIN THE PAST MONTH, HAVE YOU WISHED YOU WERE DEAD OR WISHED YOU COULD GO TO SLEEP AND NOT WAKE UP?: NO
2. HAVE YOU ACTUALLY HAD ANY THOUGHTS OF KILLING YOURSELF?: NO

## 2024-06-24 NOTE — PROGRESS NOTES
Session Code 17043  / : neel #[id number]         Chief Complaint   Patient presents with    Routine Prenatal Visit       Patient identified with 2 patient identifiers (name and D.O.B)    Patient is a  at 24w4d    Leakage of Fluid: NO  Vaginal Bleeding: NO  Fetal Movement if > 20 weeks: YES  Prenatal vitamins: YES  Having Contractions: Yes, happens everyday.   Pain: NO    LMP 2024 (Approximate)     Immunization History   Administered Date(s) Administered    Hep B, ENGERIX-B, (age 20y+), IM, 1mL 2024    Influenza, FLUCELVAX, (age 6 mo+), MDCK, PF, 0.5mL 2024       1. Have you been to the ER, urgent care clinic since your last visit?  Hospitalized since your last visit?NO    2. Have you seen or consulted any other health care providers outside of the [facility] since your last visit?  Include any pap smears or colon screening. NO

## 2024-06-24 NOTE — PROGRESS NOTES
I discussed with the resident the medical history and the resident's findings on physical exam. I discussed with the resident the patient's diagnosis and agree with the plan of care.     27yo  at 24w4d by --/    IUP: RH pos  low risk NIPT, Horizon 14 neg  anatomy okay but suboptimal views so has f/up, f/up scheduled    Obesity: A1C 4.9, early GTT okay  on ASA, EFW 67th%   Depression/Anxiety: PHQ21 (up from 18), has met with SW/CM, lots of family problems, is doing counseling, wants to switch from Zoloft to Lexapro as it's getting worse   Hx spotting: resolved   Hep B Non-Immune: 1st , 2nd     Estimated Date of Delivery: 10/10/24

## 2024-06-24 NOTE — PROCEDURES
PATIENT: BRIAN CUNHA   -  : 1995   -  DOS:2024   -  INTERPRETING PROVIDER:Adi Newberry,   Indication  ========    Obesity, F/U anatomy    Method  ======    Transabdominal ultrasound examination. View: Suboptimal view: limited by maternal body habitus. Suboptimal view: limited by fetal position    Pregnancy  =========    [place] pregnancy. Number of fetuses: 1    Dating  ======    LMP on: 2024  GA by LMP 23 w + 4 d  PATTI by LMP: 10/17/2024  Previous Ultrasound on: 3/13/2024  Type of prior assessment: GA  GA at prior assessment date 9 w + 6 d  GA by previous U/S 24 w + 4 d  PATTI by previous Ultrasound: 10/10/2024  Ultrasound examination on: 2024  GA by U/S based upon: AC, BPD, Femur, HC  GA by U/S 25 w + 1 d  PATTI by U/S: 10/6/2024  Assigned: based on ultrasound (GA), selected on 2024  Assigned GA 24 w + 4 d  Assigned PATTI: 10/10/2024    Fetal Biometry  ============    Standard  BPD 61.7 mm 25w 0d 61% Hadlock  OFD 81.3 mm 26w 3d 95% Arlene  .3 mm 24w 6d 40% Hadlock  .9 mm 25w 0d 55% Hadlock  Femur 46.7 mm 25w 4d 68% Hadlock  Humerus 41.4 mm 25w 0d 54% Arlene   g 25w 0d 67% Hadlock  EFW (lb) 1 lb  EFW (oz) 12 oz  EFW by: Hadlock (BPD-HC-AC-FL)  Other Structures   bpm    General Evaluation  ==============    Cardiac activity present.  bpm. Fetal movements: visualized. Presentation: Cephalic  Placenta: Placental site: posterior, appropriate distance from the internal os  Umbilical cord: Cord vessels: 3 vessel cord. Insertion site: central  Amniotic fluid: Amount of AF: normal. MVP 3.9 cm    Fetal Anatomy  ===========    Cavum septi pellucidi: normal  Head / Neck  Neck: normal  Lips: NOT VISUALIZED  Profile: normal  Nose: NOT VISUALIZED  4-chamber view: normal  RVOT view: normal  LVOT view: normal  Heart / Thorax  Situs: situs solitus (normal)  Aortic arch view: normal  Bicaval view: normal  Ductal arch view: normal  Interventricular

## 2024-07-02 NOTE — PROGRESS NOTES
I reviewed with the resident the medical history and the resident's findings on the physical examination.  I discussed with the resident the patient's diagnosis and concur with the plan, with the following additions:     28 y.o.   at 17w6d by 9 week US here for KARRIE    BP Readings from Last 3 Encounters:   24 (!) 90/58   24 99/65   24 108/62       Wt Readings from Last 10 Encounters:   24 82.3 kg (181 lb 6.4 oz)   24 79.7 kg (175 lb 12.8 oz)   24 75.8 kg (167 lb 3.2 oz)   04/10/24 74.6 kg (164 lb 6.4 oz)   24 76.9 kg (169 lb 9.6 oz)   24 70.3 kg (155 lb)          #KARRIE:   - Labs reviewed - Rh+ and ab screen neg; Hep B Ag neg and nonimmune; rubella immune; varicella immune; HIV and syphilis NR, GC/Chlaymydia neg; early GTT passed; GTT @ 24-28wks; TSH not done; Ucx no growth; NIPT and Carrier Screening low risk; Hemoglobin evaluation wnl. GBS to be collected within 5 weeks of anticipated delivery.  - Last Pap 3/2024, NILM  - Sonos reviewed. anatomy sono scheduled.   - Immunizations:; Tdap at 28; Hep B indicated  - Indications for Aspirin: obesity, SES  - Taking PNV.  - Anticipated mode of delivery: vaignal  - Contraception plan: TBD    #Depression  - was started on zoloft in 1st tri, initially improved but subsequently worsened.   - Has been seen by SHAHEEN. Provided w resources.  - Seeing counselor every week which has been helpful.  - Lots of family stressors.  - previously discussed switching to lexapro, so will do this today.    #Obesity  - Passed early GTT  - ASA  - counseled healthy weight gain in pregnancy.  -  tri growth.    #HBV NI  - Vaccine 1 of 3 today, and at 1 and 6 mo.      Today: remind of anatomy appt, check in on mood, switch to lexapro, HBV Vax 1 of 3     Next visit: review anatomy, GTT and CBC (lab closed today)        Follow up in 1 weeks.  2024         30 minutes were spent on the day of this encounter both with the patient and in related

## 2024-07-03 ENCOUNTER — ROUTINE PRENATAL (OUTPATIENT)
Age: 29
End: 2024-07-03
Payer: COMMERCIAL

## 2024-07-03 VITALS
SYSTOLIC BLOOD PRESSURE: 97 MMHG | TEMPERATURE: 98.4 F | WEIGHT: 182.6 LBS | HEART RATE: 90 BPM | RESPIRATION RATE: 16 BRPM | OXYGEN SATURATION: 96 % | BODY MASS INDEX: 36.81 KG/M2 | DIASTOLIC BLOOD PRESSURE: 61 MMHG | HEIGHT: 59 IN

## 2024-07-03 DIAGNOSIS — O09.92 SUPERVISION OF HIGH RISK PREGNANCY IN SECOND TRIMESTER: ICD-10-CM

## 2024-07-03 DIAGNOSIS — Z3A.21 21 WEEKS GESTATION OF PREGNANCY: ICD-10-CM

## 2024-07-03 DIAGNOSIS — F32.A DEPRESSION, UNSPECIFIED DEPRESSION TYPE: Primary | ICD-10-CM

## 2024-07-03 PROCEDURE — 99213 OFFICE O/P EST LOW 20 MIN: CPT

## 2024-07-03 RX ORDER — PNV NO.95/FERROUS FUM/FOLIC AC 28MG-0.8MG
1 TABLET ORAL
Qty: 30 TABLET | Refills: 2 | Status: SHIPPED | OUTPATIENT
Start: 2024-07-03

## 2024-07-03 RX ORDER — ASPIRIN 81 MG/1
81 TABLET ORAL DAILY
Qty: 90 TABLET | Refills: 0 | Status: SHIPPED | OUTPATIENT
Start: 2024-07-03

## 2024-07-03 RX ORDER — ESCITALOPRAM OXALATE 10 MG/1
10 TABLET ORAL DAILY
Qty: 30 TABLET | Refills: 3 | Status: SHIPPED | OUTPATIENT
Start: 2024-07-03 | End: 2024-07-03

## 2024-07-03 RX ORDER — ESCITALOPRAM OXALATE 10 MG/1
20 TABLET ORAL DAILY
Qty: 30 TABLET | Refills: 3 | Status: SHIPPED | OUTPATIENT
Start: 2024-07-03

## 2024-07-03 ASSESSMENT — PATIENT HEALTH QUESTIONNAIRE - PHQ9
4. FEELING TIRED OR HAVING LITTLE ENERGY: MORE THAN HALF THE DAYS
7. TROUBLE CONCENTRATING ON THINGS, SUCH AS READING THE NEWSPAPER OR WATCHING TELEVISION: SEVERAL DAYS
5. POOR APPETITE OR OVEREATING: NEARLY EVERY DAY
1. LITTLE INTEREST OR PLEASURE IN DOING THINGS: MORE THAN HALF THE DAYS
9. THOUGHTS THAT YOU WOULD BE BETTER OFF DEAD, OR OF HURTING YOURSELF: NOT AT ALL
2. FEELING DOWN, DEPRESSED OR HOPELESS: MORE THAN HALF THE DAYS
3. TROUBLE FALLING OR STAYING ASLEEP: NEARLY EVERY DAY
10. IF YOU CHECKED OFF ANY PROBLEMS, HOW DIFFICULT HAVE THESE PROBLEMS MADE IT FOR YOU TO DO YOUR WORK, TAKE CARE OF THINGS AT HOME, OR GET ALONG WITH OTHER PEOPLE: SOMEWHAT DIFFICULT
6. FEELING BAD ABOUT YOURSELF - OR THAT YOU ARE A FAILURE OR HAVE LET YOURSELF OR YOUR FAMILY DOWN: NEARLY EVERY DAY
SUM OF ALL RESPONSES TO PHQ QUESTIONS 1-9: 16
SUM OF ALL RESPONSES TO PHQ9 QUESTIONS 1 & 2: 4
SUM OF ALL RESPONSES TO PHQ QUESTIONS 1-9: 16
8. MOVING OR SPEAKING SO SLOWLY THAT OTHER PEOPLE COULD HAVE NOTICED. OR THE OPPOSITE, BEING SO FIGETY OR RESTLESS THAT YOU HAVE BEEN MOVING AROUND A LOT MORE THAN USUAL: NOT AT ALL

## 2024-07-03 ASSESSMENT — ANXIETY QUESTIONNAIRES
7. FEELING AFRAID AS IF SOMETHING AWFUL MIGHT HAPPEN: NEARLY EVERY DAY
6. BECOMING EASILY ANNOYED OR IRRITABLE: MORE THAN HALF THE DAYS
IF YOU CHECKED OFF ANY PROBLEMS ON THIS QUESTIONNAIRE, HOW DIFFICULT HAVE THESE PROBLEMS MADE IT FOR YOU TO DO YOUR WORK, TAKE CARE OF THINGS AT HOME, OR GET ALONG WITH OTHER PEOPLE: VERY DIFFICULT
5. BEING SO RESTLESS THAT IT IS HARD TO SIT STILL: SEVERAL DAYS
3. WORRYING TOO MUCH ABOUT DIFFERENT THINGS: NEARLY EVERY DAY
4. TROUBLE RELAXING: NEARLY EVERY DAY
GAD7 TOTAL SCORE: 17
2. NOT BEING ABLE TO STOP OR CONTROL WORRYING: MORE THAN HALF THE DAYS
1. FEELING NERVOUS, ANXIOUS, OR ON EDGE: NEARLY EVERY DAY

## 2024-07-03 NOTE — PROGRESS NOTES
Chief Complaint   Patient presents with    Routine Prenatal Visit    Javi Stein #[id number]     Patient identified by name and . Patient is a  at 25w6d.    Taking prenatal vitamins: Yes  Leakage of fluid: No  Vaginal bleeding: No  Feeling baby move if over 20 weeks: Yes  Contractions: No  Pain: No    Vitals:    24 0959 24 1002   BP: 97/61 97/61   Site: Right Upper Arm Left Upper Arm   Position: Sitting Sitting   Cuff Size: Large Adult Medium Adult   Pulse: 87 90   Resp: 16    Temp: 98.4 °F (36.9 °C)    TempSrc: Oral    SpO2: 96%    Weight: 82.8 kg (182 lb 9.6 oz)    Height: 1.5 m (4' 11.06\")         Immunization History   Administered Date(s) Administered    Hep B, ENGERIX-B, (age 20y+), IM, 1mL 2024, 2024    Influenza, FLUCELVAX, (age 6 mo+), MDCK, PF, 0.5mL 2024       1. Have you been to the ER, urgent care clinic since your last visit?  Hospitalized since your last visit?No    2. Have you seen or consulted any other health care providers outside of the [facility] since your last visit?  Include any pap smears or colon screening. No

## 2024-07-08 ENCOUNTER — ROUTINE PRENATAL (OUTPATIENT)
Age: 29
End: 2024-07-08

## 2024-07-08 VITALS
BODY MASS INDEX: 37.01 KG/M2 | RESPIRATION RATE: 18 BRPM | SYSTOLIC BLOOD PRESSURE: 103 MMHG | WEIGHT: 183.6 LBS | OXYGEN SATURATION: 97 % | TEMPERATURE: 98.6 F | HEART RATE: 95 BPM | HEIGHT: 59 IN | DIASTOLIC BLOOD PRESSURE: 68 MMHG

## 2024-07-08 DIAGNOSIS — F32.A DEPRESSION, UNSPECIFIED DEPRESSION TYPE: ICD-10-CM

## 2024-07-08 DIAGNOSIS — Z3A.26 26 WEEKS GESTATION OF PREGNANCY: Primary | ICD-10-CM

## 2024-07-08 LAB
ERYTHROCYTE [DISTWIDTH] IN BLOOD BY AUTOMATED COUNT: 12.8 % (ref 11.5–14.5)
GLUCOSE 1H P 100 G GLC PO SERPL-MCNC: 115 MG/DL (ref 65–140)
HCT VFR BLD AUTO: 37.4 % (ref 35–47)
HGB BLD-MCNC: 12.2 G/DL (ref 11.5–16)
MCH RBC QN AUTO: 29.6 PG (ref 26–34)
MCHC RBC AUTO-ENTMCNC: 32.6 G/DL (ref 30–36.5)
MCV RBC AUTO: 90.8 FL (ref 80–99)
NRBC # BLD: 0 K/UL (ref 0–0.01)
NRBC BLD-RTO: 0 PER 100 WBC
PLATELET # BLD AUTO: 278 K/UL (ref 150–400)
PMV BLD AUTO: 9.8 FL (ref 8.9–12.9)
RBC # BLD AUTO: 4.12 M/UL (ref 3.8–5.2)
WBC # BLD AUTO: 11.7 K/UL (ref 3.6–11)

## 2024-07-08 PROCEDURE — 0502F SUBSEQUENT PRENATAL CARE: CPT

## 2024-07-08 RX ORDER — ESCITALOPRAM OXALATE 10 MG/1
20 TABLET ORAL DAILY
Qty: 90 TABLET | Refills: 0 | Status: SHIPPED | OUTPATIENT
Start: 2024-07-08

## 2024-07-08 ASSESSMENT — ANXIETY QUESTIONNAIRES
5. BEING SO RESTLESS THAT IT IS HARD TO SIT STILL: SEVERAL DAYS
6. BECOMING EASILY ANNOYED OR IRRITABLE: SEVERAL DAYS
7. FEELING AFRAID AS IF SOMETHING AWFUL MIGHT HAPPEN: MORE THAN HALF THE DAYS
GAD7 TOTAL SCORE: 14
4. TROUBLE RELAXING: SEVERAL DAYS
2. NOT BEING ABLE TO STOP OR CONTROL WORRYING: NEARLY EVERY DAY
1. FEELING NERVOUS, ANXIOUS, OR ON EDGE: NEARLY EVERY DAY
IF YOU CHECKED OFF ANY PROBLEMS ON THIS QUESTIONNAIRE, HOW DIFFICULT HAVE THESE PROBLEMS MADE IT FOR YOU TO DO YOUR WORK, TAKE CARE OF THINGS AT HOME, OR GET ALONG WITH OTHER PEOPLE: SOMEWHAT DIFFICULT
3. WORRYING TOO MUCH ABOUT DIFFERENT THINGS: NEARLY EVERY DAY

## 2024-07-08 ASSESSMENT — PATIENT HEALTH QUESTIONNAIRE - PHQ9
8. MOVING OR SPEAKING SO SLOWLY THAT OTHER PEOPLE COULD HAVE NOTICED. OR THE OPPOSITE, BEING SO FIGETY OR RESTLESS THAT YOU HAVE BEEN MOVING AROUND A LOT MORE THAN USUAL: SEVERAL DAYS
5. POOR APPETITE OR OVEREATING: SEVERAL DAYS
10. IF YOU CHECKED OFF ANY PROBLEMS, HOW DIFFICULT HAVE THESE PROBLEMS MADE IT FOR YOU TO DO YOUR WORK, TAKE CARE OF THINGS AT HOME, OR GET ALONG WITH OTHER PEOPLE: SOMEWHAT DIFFICULT
4. FEELING TIRED OR HAVING LITTLE ENERGY: MORE THAN HALF THE DAYS
SUM OF ALL RESPONSES TO PHQ QUESTIONS 1-9: 11
SUM OF ALL RESPONSES TO PHQ QUESTIONS 1-9: 11
9. THOUGHTS THAT YOU WOULD BE BETTER OFF DEAD, OR OF HURTING YOURSELF: NOT AT ALL
2. FEELING DOWN, DEPRESSED OR HOPELESS: SEVERAL DAYS
6. FEELING BAD ABOUT YOURSELF - OR THAT YOU ARE A FAILURE OR HAVE LET YOURSELF OR YOUR FAMILY DOWN: SEVERAL DAYS
3. TROUBLE FALLING OR STAYING ASLEEP: NEARLY EVERY DAY
SUM OF ALL RESPONSES TO PHQ QUESTIONS 1-9: 11
1. LITTLE INTEREST OR PLEASURE IN DOING THINGS: SEVERAL DAYS
SUM OF ALL RESPONSES TO PHQ QUESTIONS 1-9: 11
SUM OF ALL RESPONSES TO PHQ9 QUESTIONS 1 & 2: 2
7. TROUBLE CONCENTRATING ON THINGS, SUCH AS READING THE NEWSPAPER OR WATCHING TELEVISION: SEVERAL DAYS

## 2024-07-08 NOTE — PROGRESS NOTES
: [id number]    Chief Complaint   Patient presents with    Routine Prenatal Visit        Patient identified by name and . Patient is a  at 26w4d.    Taking prenatal vitamins: Yes  Leakage of fluid: No  Vaginal bleeding: No  Feeling baby move if over 20 weeks: Yes  Contractions: No  Pain: No    Vitals:    24 0957 24 1011   BP: (!) 94/58 103/68   Site: Right Upper Arm Left Upper Arm   Position: Sitting Sitting   Cuff Size: Medium Adult Medium Adult   Pulse: 95    Resp: 18    Temp: 98.6 °F (37 °C)    TempSrc: Oral    SpO2: 97%    Weight: 83.3 kg (183 lb 9.6 oz)    Height: 1.5 m (4' 11.06\")         Immunization History   Administered Date(s) Administered    Hep B, ENGERIX-B, (age 20y+), IM, 1mL 2024, 2024    Influenza, FLUCELVAX, (age 6 mo+), MDCK, PF, 0.5mL 2024       1. Have you been to the ER, urgent care clinic since your last visit?  Hospitalized since your last visit?No    2. Have you seen or consulted any other health care providers outside of the [facility] since your last visit?  Include any pap smears or colon screening. No          
I discussed with the resident the medical history and the resident's findings on physical exam. I discussed with the resident the patient's diagnosis and agree with the plan of care.     29yo  at 26w4d by --/9    IUP: RH pos  low risk NIPT, Horizon 14 neg  anatomy okay but suboptimal views so has f/up, f/up scheduled, GTT+CBC today   Obesity: A1C 4.9, early GTT okay  on ASA, EFW 67th%   Depression/Anxiety: PHQ21 (up from 18), has met with SW/ANJANA, lots of family problems, is doing counseling, Lexapro 20mg  Hep B Non-Immune: 1st , 2nd     Estimated Date of Delivery: 10/10/24      
bpm  PSYCH: normal mood and affect    Labs  No results found for this or any previous visit (from the past 12 hour(s)).    Assessment        Diagnosis Orders   1. 26 weeks gestation of pregnancy  Glucose Tolerance Gestational, 1 Hour    CBC            Plan   28 y.o.  at 26w4d by LMP (c/w 9w US), PATTI Estimated Date of Delivery: 10/10/24 here for return OB visit.     SIUP:  -PNL: O+, Ab neg, first trim Hb 13.3, Hgb fractionation wnl, HepBSAg and Ab neg, Hep B core total Ab neg (NI), Hep C Ab NR, HIV/RPR NR, Rubella/VZV immune, Ucx no growth, Pap result NILM. GC/CT/Trich negative. Passed early 1' GTT, A1c 4.9.  -Immunizations: give Tdap at 28w, 1st Hep B 24, 24  -Ultrasound:   - Anatomy on 24, EFW 80%, AC 77%. Normal anatomy. Sub optimal views, follow up 24.  - Anatomy on 24, EFW 67%, AC 55%, HC 40%, FL 68%. Posterior placenta, LATRELL wnl, 3vc, suboptimal views, anatomy otherwise normal   - Repeat growth on 24 for suboptimal views  -Genetic Testing: Normal, Patient informed of female fetus.   -Rpt CBC today  -Follow up: 24 with Dr. Billy, Sue at this appointment     Pregnancy Complications:     Depression/Anxiety:  No SI today. Reports feeling some better after with starting therapy, but still often sad.  No SI or HI today. This pregnancy changed medications as zoloft 100mg qd doesn't notice much difference.   PHQ9 increased to down to 16 from 18 at last visit.  Continue to check PHQ-9 and ANDREW-7 at follow up visits.  PHQ9 11, GAD7 is 14 today  - Cont Lexapro 20mg qd   - Continue counseling appts- has seen once, has follow up this week.      Insomnia: takes 2 hours to fall asleep. Likely 2/2 ongoing mood as above. Interested in medication to try to help.   -Trial Doxalymine 25mg qhs.      Obesity: Passed early 1' GTT, A1c 4.9. Nulliparous.  - Continue ASA  - Rpt GTT today     Hx of Bleeding: No concerns today. Trace blood on UA at prior visit.     Hep B non-immune: S/p 1st dose

## 2024-07-22 ENCOUNTER — ROUTINE PRENATAL (OUTPATIENT)
Age: 29
End: 2024-07-22
Payer: COMMERCIAL

## 2024-07-22 VITALS — HEART RATE: 89 BPM | SYSTOLIC BLOOD PRESSURE: 104 MMHG | DIASTOLIC BLOOD PRESSURE: 64 MMHG

## 2024-07-22 DIAGNOSIS — E66.9 OBESITY (BMI 35.0-39.9 WITHOUT COMORBIDITY): Primary | ICD-10-CM

## 2024-07-22 PROCEDURE — 76816 OB US FOLLOW-UP PER FETUS: CPT | Performed by: OBSTETRICS & GYNECOLOGY

## 2024-07-22 NOTE — PROCEDURES
PATIENT: BRIAN CUNHA   -  : 1995   -  DOS:2024   -  INTERPRETING PROVIDER:Adi Newberry,   Indication  ========    Obesity, F/U anatomy    Method  ======    Transabdominal ultrasound examination. View: Suboptimal view: limited by maternal body habitus    Pregnancy  =========    [place] pregnancy. Number of fetuses: 1    Dating  ======    LMP on: 2024  GA by LMP 27 w + 4 d  PATTI by LMP: 10/17/2024  Previous Ultrasound on: 3/13/2024  Type of prior assessment: GA  GA at prior assessment date 9 w + 6 d  GA by previous U/S 28 w + 4 d  PATTI by previous Ultrasound: 10/10/2024  Ultrasound examination on: 2024  GA by U/S based upon: AC, BPD, Femur, HC  GA by U/S 28 w + 3 d  PATTI by U/S: 10/11/2024  Assigned: based on ultrasound (GA), selected on 2024  Assigned GA 28 w + 4 d  Assigned PATTI: 10/10/2024    Fetal Biometry  ============    Standard  BPD 70.5 mm 28w 2d 30% Hadlock  OFD 91.6 mm 29w 4d 75% Arlene  .3 mm 28w 1d 9% Hadlock  Cerebellum tr 33.5 mm 28w 2d 49% Hill  .7 mm 28w 5d 48% Hadlock  Femur 53.9 mm 28w 4d 34% Hadlock  Humerus 50.3 mm 29w 3d 68% Arlene  EFW 1,249 g 28w 2d 37% Hadlock  EFW (lb) 2 lb  EFW (oz) 12 oz  EFW by: Hadlock (BPD-HC-AC-FL)  Extended   5.0 mm  Other Structures   bpm    General Evaluation  ==============    Cardiac activity present.  bpm. Fetal movements: visualized. Presentation: Cephalic  Placenta: Placental site: posterior, appropriate distance from the internal os  Umbilical cord: Cord vessels: 3 vessel cord. Insertion site: central  Amniotic fluid: Amount of AF: normal. MVP 3.7 cm. LATRELL 13.2 cm. Q1 3.3 cm, Q2 2.8 cm, Q3 3.7 cm, Q4 3.5 cm    Fetal Anatomy  ===========    Lips: normal  Nose: normal  4-chamber view: documented previously  Stomach: normal  Kidneys: normal  Bladder: normal  Thoracic spine: normal  Lumbar spine: normal  Wants to know fetal sex: yes    Findings  =======    Intrauterine Everett pregnancy at 28w

## 2024-07-26 ENCOUNTER — ROUTINE PRENATAL (OUTPATIENT)
Age: 29
End: 2024-07-26
Payer: COMMERCIAL

## 2024-07-26 VITALS
RESPIRATION RATE: 18 BRPM | HEART RATE: 95 BPM | BODY MASS INDEX: 38.06 KG/M2 | WEIGHT: 188.8 LBS | TEMPERATURE: 97.8 F | SYSTOLIC BLOOD PRESSURE: 92 MMHG | HEIGHT: 59 IN | OXYGEN SATURATION: 96 % | DIASTOLIC BLOOD PRESSURE: 61 MMHG

## 2024-07-26 DIAGNOSIS — Z34.90 PREGNANCY, UNSPECIFIED GESTATIONAL AGE: Primary | ICD-10-CM

## 2024-07-26 PROCEDURE — 90715 TDAP VACCINE 7 YRS/> IM: CPT

## 2024-07-26 NOTE — PROGRESS NOTES
[address] [place] [address]   Office [phone], Fax [phone]    Return OB Visit     Subjective:   Jaylene Tobin 28 y.o.  at GA:  29w1d , PATTI: Estimated Date of Delivery: 10/10/24 by LMP and confirmed by first trimester US at 9w.     Reports more lower abdominal pain.   Reports improved mental health.   Asking about less fetal movement recently.   Asking about small amounts of fluid leakage from nipples.  Intermittent numbness in hands  Sleeping better    OB History:  See Chart    LOF: no  Vaginal bleeding: no  Fetal movement (after 20 weeks): yes  Contractions: no  Taking prenatal vitamins: yes  Taking ASA:  yes    Allergies- reviewed:   No Known Allergies  Medications- reviewed:   Current Outpatient Medications   Medication Sig    escitalopram (LEXAPRO) 10 MG tablet Take 2 tablets by mouth daily    aspirin 81 MG EC tablet Take 1 tablet by mouth daily    Prenatal Vit-Fe Fumarate-FA (PRENATAL VITAMINS) 28-0.8 MG TABS Take 1 tablet by mouth daily (with breakfast)    doxyLAMINE succinate (GNP SLEEP AID) 25 MG tablet Take 1 tablet by mouth nightly as needed for Sleep (Patient not taking: Reported on 2024)    doxylamine-pyridoxine 10-10 MG TBEC Take 10 mg by mouth daily (Patient not taking: Reported on 3/13/2024)     No current facility-administered medications for this visit.     Past Medical History- reviewed:  History reviewed. No pertinent past medical history.  Past Surgical History- reviewed:   History reviewed. No pertinent surgical history.  Social History- reviewed:  Social History     Socioeconomic History    Marital status: Single     Spouse name: Not on file    Number of children: Not on file    Years of education: Not on file    Highest education level: Not on file   Occupational History    Not on file   Tobacco Use    Smoking status: Never    Smokeless tobacco: Never   Vaping Use    Vaping Use: Never used   Substance and Sexual Activity    Alcohol use: Never

## 2024-07-26 NOTE — PROGRESS NOTES
Chief Complaint   Patient presents with    Routine Prenatal Visit     Baby move: yes  Vaginal bleeding: no  Vaginal fluid: no  Contractions: no  Concerns: no     Vitals:    07/26/24 0952   BP: 92/61   Site: Right Upper Arm   Position: Sitting   Cuff Size: Medium Adult   Pulse: 95   Resp: 18   Temp: 97.8 °F (36.6 °C)   TempSrc: Temporal   SpO2: 96%   Weight: 85.6 kg (188 lb 12.8 oz)   Height: 1.5 m (4' 11.06\")     \"Have you been to the ER, urgent care clinic since your last visit?  Hospitalized since your last visit?\"    NO    “Have you seen or consulted any other health care providers outside of [facility] since your last visit?”    NO            Click Here for Release of Records Request

## 2024-07-26 NOTE — PROGRESS NOTES
[address] [place] [address]   Office [phone], Fax [phone]    Return OB Visit       Subjective:   Jaylene Tobin 28 y.o.  at GA:  29w1d , PATTI: Estimated Date of Delivery: 10/10/24 by LMP and confirmed by first trimester US at 9w.     Patient reports feeling well. No new concerns at this time. Patient denies headache, visual disturbances, CP, SOB, RUQ pain, dysuria, and calf tenderness.     She started the     OB History:  See Chart    LOF: no  Vaginal bleeding: no  Fetal movement (after 20 weeks): yes  Contractions: no  Taking prenatal vitamins: yes  Taking ASA:  yes      Allergies- reviewed:   No Known Allergies  Medications- reviewed:   Current Outpatient Medications   Medication Sig    doxyLAMINE succinate (GNP SLEEP AID) 25 MG tablet Take 1 tablet by mouth nightly as needed for Sleep (Patient not taking: Reported on 2024)    escitalopram (LEXAPRO) 10 MG tablet Take 2 tablets by mouth daily    aspirin 81 MG EC tablet Take 1 tablet by mouth daily    Prenatal Vit-Fe Fumarate-FA (PRENATAL VITAMINS) 28-0.8 MG TABS Take 1 tablet by mouth daily (with breakfast)    doxylamine-pyridoxine 10-10 MG TBEC Take 10 mg by mouth daily (Patient not taking: Reported on 3/13/2024)     No current facility-administered medications for this visit.     Past Medical History- reviewed:  No past medical history on file.  Past Surgical History- reviewed:   No past surgical history on file.  Social History- reviewed:  Social History     Socioeconomic History    Marital status: Single     Spouse name: Not on file    Number of children: Not on file    Years of education: Not on file    Highest education level: Not on file   Occupational History    Not on file   Tobacco Use    Smoking status: Never    Smokeless tobacco: Never   Vaping Use    Vaping Use: Never used   Substance and Sexual Activity    Alcohol use: Never    Drug use: Never    Sexual activity: Yes     Partners: Male   Other Topics

## 2024-07-26 NOTE — PROGRESS NOTES
I discussed with the resident the medical history and the resident's findings on physical exam. I discussed with the resident the patient's diagnosis and agree with the plan of care.     27yo  at 29w1d by --/    IUP: RH pos  low risk NIPT, Horizon 14 neg  anatomy okay but suboptimal views so has f/up, f/up scheduled, GTT+CBC ok, tdap today   Obesity: A1C 4.9, early GTT okay  on ASA, EFW 37th% on    Depression/Anxiety: depression screening has improved some and pt reports doing better, has met with SW/CM, lots of family problems, is doing counseling, continue Lexapro 20mg  Hep B Non-Immune: ,     Estimated Date of Delivery: 10/10/24

## 2024-08-08 ENCOUNTER — ROUTINE PRENATAL (OUTPATIENT)
Age: 29
End: 2024-08-08

## 2024-08-08 VITALS
HEIGHT: 59 IN | TEMPERATURE: 98 F | HEART RATE: 95 BPM | BODY MASS INDEX: 38.87 KG/M2 | DIASTOLIC BLOOD PRESSURE: 72 MMHG | OXYGEN SATURATION: 98 % | WEIGHT: 192.8 LBS | RESPIRATION RATE: 18 BRPM | SYSTOLIC BLOOD PRESSURE: 106 MMHG

## 2024-08-08 DIAGNOSIS — Z34.90 PREGNANCY, UNSPECIFIED GESTATIONAL AGE: Primary | ICD-10-CM

## 2024-08-08 ASSESSMENT — ANXIETY QUESTIONNAIRES
7. FEELING AFRAID AS IF SOMETHING AWFUL MIGHT HAPPEN: SEVERAL DAYS
6. BECOMING EASILY ANNOYED OR IRRITABLE: SEVERAL DAYS
1. FEELING NERVOUS, ANXIOUS, OR ON EDGE: SEVERAL DAYS
2. NOT BEING ABLE TO STOP OR CONTROL WORRYING: SEVERAL DAYS
3. WORRYING TOO MUCH ABOUT DIFFERENT THINGS: SEVERAL DAYS
IF YOU CHECKED OFF ANY PROBLEMS ON THIS QUESTIONNAIRE, HOW DIFFICULT HAVE THESE PROBLEMS MADE IT FOR YOU TO DO YOUR WORK, TAKE CARE OF THINGS AT HOME, OR GET ALONG WITH OTHER PEOPLE: SOMEWHAT DIFFICULT
GAD7 TOTAL SCORE: 6
5. BEING SO RESTLESS THAT IT IS HARD TO SIT STILL: NOT AT ALL
4. TROUBLE RELAXING: SEVERAL DAYS

## 2024-08-08 ASSESSMENT — PATIENT HEALTH QUESTIONNAIRE - PHQ9
10. IF YOU CHECKED OFF ANY PROBLEMS, HOW DIFFICULT HAVE THESE PROBLEMS MADE IT FOR YOU TO DO YOUR WORK, TAKE CARE OF THINGS AT HOME, OR GET ALONG WITH OTHER PEOPLE: SOMEWHAT DIFFICULT
4. FEELING TIRED OR HAVING LITTLE ENERGY: MORE THAN HALF THE DAYS
3. TROUBLE FALLING OR STAYING ASLEEP: NEARLY EVERY DAY
1. LITTLE INTEREST OR PLEASURE IN DOING THINGS: SEVERAL DAYS
SUM OF ALL RESPONSES TO PHQ QUESTIONS 1-9: 8
5. POOR APPETITE OR OVEREATING: NOT AT ALL
SUM OF ALL RESPONSES TO PHQ9 QUESTIONS 1 & 2: 2
2. FEELING DOWN, DEPRESSED OR HOPELESS: SEVERAL DAYS
8. MOVING OR SPEAKING SO SLOWLY THAT OTHER PEOPLE COULD HAVE NOTICED. OR THE OPPOSITE, BEING SO FIGETY OR RESTLESS THAT YOU HAVE BEEN MOVING AROUND A LOT MORE THAN USUAL: NOT AT ALL
SUM OF ALL RESPONSES TO PHQ QUESTIONS 1-9: 8
6. FEELING BAD ABOUT YOURSELF - OR THAT YOU ARE A FAILURE OR HAVE LET YOURSELF OR YOUR FAMILY DOWN: SEVERAL DAYS
SUM OF ALL RESPONSES TO PHQ QUESTIONS 1-9: 8
9. THOUGHTS THAT YOU WOULD BE BETTER OFF DEAD, OR OF HURTING YOURSELF: NOT AT ALL
7. TROUBLE CONCENTRATING ON THINGS, SUCH AS READING THE NEWSPAPER OR WATCHING TELEVISION: NOT AT ALL
SUM OF ALL RESPONSES TO PHQ QUESTIONS 1-9: 8

## 2024-08-08 NOTE — PROGRESS NOTES
[address] [place] [address]   Office [phone], Fax [phone]    Return OB Visit     Subjective:   Jaylene Tobin 28 y.o.  at GA:  31w0d , PATTI: Estimated Date of Delivery: 10/10/24 by LMP and confirmed by first trimester US at 9w.     With some difficulty sleeping. Has not started taking her doxylamine. Overall doing okay with no SI.    OB History:  See Chart    LOF: no  Vaginal bleeding: no  Fetal movement (after 20 weeks): yes  Contractions: no  Taking prenatal vitamins: yes  Taking ASA:  yes    Allergies- reviewed:   No Known Allergies  Medications- reviewed:   Current Outpatient Medications   Medication Sig    escitalopram (LEXAPRO) 10 MG tablet Take 2 tablets by mouth daily    aspirin 81 MG EC tablet Take 1 tablet by mouth daily    Prenatal Vit-Fe Fumarate-FA (PRENATAL VITAMINS) 28-0.8 MG TABS Take 1 tablet by mouth daily (with breakfast)    doxylamine-pyridoxine 10-10 MG TBEC Take 10 mg by mouth daily (Patient not taking: Reported on 3/13/2024)     No current facility-administered medications for this visit.     Past Medical History- reviewed:  History reviewed. No pertinent past medical history.  Past Surgical History- reviewed:   History reviewed. No pertinent surgical history.  Social History- reviewed:  Social History     Socioeconomic History    Marital status: Single     Spouse name: Not on file    Number of children: Not on file    Years of education: Not on file    Highest education level: Not on file   Occupational History    Not on file   Tobacco Use    Smoking status: Never    Smokeless tobacco: Never   Vaping Use    Vaping Use: Never used   Substance and Sexual Activity    Alcohol use: Never    Drug use: Never    Sexual activity: Yes     Partners: Male   Other Topics Concern    Not on file   Social History Narrative    Not on file     Social Determinants of Health     Financial Resource Strain: Patient Declined (3/13/2024)    Overall Financial Resource Strain

## 2024-08-08 NOTE — PROGRESS NOTES
I reviewed with the resident the medical history and the resident's findings on the physical examination.  I discussed with the resident the patient's diagnosis and concur with the plan.    Jaylene Tobin is a 28 y.o. female  at 31w0d. presents for routine PNC. 10/10/2024, by Ultrasound  Concerns addressed: Routine PNC  Pregnancy complicated by: Anxiety (on Lexapro), maternal obesity, Hep B non immune  Denies VB, LOF, Contractions. + Fetal movement.  Weight gain reviewed.  RTC in 2 weeks    /72 (Site: Right Upper Arm, Position: Sitting, Cuff Size: Large Adult)   Pulse (!) 103   Temp 98 °F (36.7 °C) (Temporal)   Resp 18   Ht 1.5 m (4' 11.06\")   Wt 87.5 kg (192 lb 12.8 oz)   LMP 2024 (Approximate)   SpO2 96%   BMI 38.86 kg/m²   FHT: 145  FH: 31cm

## 2024-08-08 NOTE — PROGRESS NOTES
Chief Complaint   Patient presents with    Routine Prenatal Visit     Baby move: yes  Vaginal bleeding: yes, about 4 days ago, patient had it at night. Then it stopped.   Vaginal fluid: once in a while she will have some discharge.   Contractions: she doesn't think so.  Concern: she does have a lot of discomfort in her back, legs. Sometimes it is really hard to walk.      Vitals:    08/08/24 1021 08/08/24 1108   BP: 106/72    Site: Right Upper Arm    Position: Sitting    Cuff Size: Large Adult    Pulse: (!) 103 95   Resp: 18    Temp: 98 °F (36.7 °C)    TempSrc: Temporal    SpO2: 96% 98%   Weight: 87.5 kg (192 lb 12.8 oz)    Height: 1.5 m (4' 11.06\")      \"Have you been to the ER, urgent care clinic since your last visit?  Hospitalized since your last visit?\"    NO    “Have you seen or consulted any other health care providers outside of [facility] since your last visit?”    NO            Click Here for Release of Records Request

## 2024-08-22 ENCOUNTER — ROUTINE PRENATAL (OUTPATIENT)
Age: 29
End: 2024-08-22

## 2024-08-22 VITALS
WEIGHT: 196 LBS | SYSTOLIC BLOOD PRESSURE: 111 MMHG | DIASTOLIC BLOOD PRESSURE: 75 MMHG | OXYGEN SATURATION: 97 % | HEIGHT: 59 IN | HEART RATE: 100 BPM | RESPIRATION RATE: 18 BRPM | BODY MASS INDEX: 39.51 KG/M2 | TEMPERATURE: 98.1 F

## 2024-08-22 DIAGNOSIS — Z34.90 PREGNANCY, UNSPECIFIED GESTATIONAL AGE: Primary | ICD-10-CM

## 2024-08-22 PROCEDURE — 0502F SUBSEQUENT PRENATAL CARE: CPT

## 2024-08-22 ASSESSMENT — PATIENT HEALTH QUESTIONNAIRE - PHQ9
SUM OF ALL RESPONSES TO PHQ QUESTIONS 1-9: 2
2. FEELING DOWN, DEPRESSED OR HOPELESS: SEVERAL DAYS
SUM OF ALL RESPONSES TO PHQ QUESTIONS 1-9: 2
SUM OF ALL RESPONSES TO PHQ9 QUESTIONS 1 & 2: 2
SUM OF ALL RESPONSES TO PHQ QUESTIONS 1-9: 2
1. LITTLE INTEREST OR PLEASURE IN DOING THINGS: SEVERAL DAYS
SUM OF ALL RESPONSES TO PHQ QUESTIONS 1-9: 2

## 2024-08-22 NOTE — PROGRESS NOTES
Jaylene Tobin is a 28 y.o. female      Chief Complaint   Patient presents with    Routine Prenatal Visit     Patient is 33 weeks and 0 days. She is not having any vaginal bleeding or discharge. She is taking her prenatal vitamins. She is having fetal movement. No contractions. No other concerns.        \"Have you been to the ER, urgent care clinic since your last visit?  Hospitalized since your last visit?\"    NO    “Have you seen or consulted any other health care providers outside of [facility] since your last visit?”    NO              Vitals:    08/22/24 0946   BP: 111/75   Site: Right Upper Arm   Position: Sitting   Pulse: 100   Resp: 18   Temp: 98.1 °F (36.7 °C)   TempSrc: Oral   SpO2: 97%   Weight: 88.9 kg (196 lb)   Height: 1.5 m (4' 11.06\")            Health Maintenance Due   Topic Date Due    Varicella vaccine (1 of 2 - 13+ 2-dose series) Never done    COVID-19 Vaccine (1 - 2023-24 season) Never done    Flu vaccine (1) 08/01/2024         Medication Reconciliation completed, changes noted.  Please  Update medication list.

## 2024-08-22 NOTE — PROGRESS NOTES
[address] [place] [address]   Office [phone], Fax [phone]    Return OB Visit     Subjective:   Jaylene Tobin 28 y.o.  at GA: 33w0d, PATTI: Estimated Date of Delivery: 10/10/24 by LMP and confirmed by first trimester US at 9w.     OB History:  See Chart    LOF: no  Vaginal bleeding: no  Fetal movement (after 20 weeks): yes  Contractions: no  Taking prenatal vitamins: yes  Taking ASA:  yes    Allergies- reviewed:   No Known Allergies  Medications- reviewed:   Current Outpatient Medications   Medication Sig    escitalopram (LEXAPRO) 10 MG tablet Take 2 tablets by mouth daily    aspirin 81 MG EC tablet Take 1 tablet by mouth daily    Prenatal Vit-Fe Fumarate-FA (PRENATAL VITAMINS) 28-0.8 MG TABS Take 1 tablet by mouth daily (with breakfast)    doxylamine-pyridoxine 10-10 MG TBEC Take 10 mg by mouth daily (Patient not taking: Reported on 3/13/2024)     No current facility-administered medications for this visit.     Past Medical History- reviewed:  History reviewed. No pertinent past medical history.  Past Surgical History- reviewed:   History reviewed. No pertinent surgical history.  Social History- reviewed:  Social History     Socioeconomic History    Marital status: Single     Spouse name: Not on file    Number of children: Not on file    Years of education: Not on file    Highest education level: Not on file   Occupational History    Not on file   Tobacco Use    Smoking status: Never    Smokeless tobacco: Never   Vaping Use    Vaping status: Never Used   Substance and Sexual Activity    Alcohol use: Never    Drug use: Never    Sexual activity: Yes     Partners: Male   Other Topics Concern    Not on file   Social History Narrative    Not on file     Social Determinants of Health     Financial Resource Strain: Patient Declined (3/13/2024)    Overall Financial Resource Strain (CARDIA)     Difficulty of Paying Living Expenses: Patient declined   Recent Concern: Financial Resource

## 2024-08-29 ENCOUNTER — ROUTINE PRENATAL (OUTPATIENT)
Age: 29
End: 2024-08-29

## 2024-08-29 VITALS
HEIGHT: 59 IN | OXYGEN SATURATION: 95 % | WEIGHT: 197.8 LBS | BODY MASS INDEX: 39.88 KG/M2 | RESPIRATION RATE: 18 BRPM | DIASTOLIC BLOOD PRESSURE: 67 MMHG | SYSTOLIC BLOOD PRESSURE: 103 MMHG | TEMPERATURE: 98 F | HEART RATE: 90 BPM

## 2024-08-29 DIAGNOSIS — O09.90 SUPERVISION OF HIGH RISK PREGNANCY, ANTEPARTUM: Primary | ICD-10-CM

## 2024-08-29 DIAGNOSIS — F32.A ANXIETY AND DEPRESSION: ICD-10-CM

## 2024-08-29 DIAGNOSIS — F41.9 ANXIETY AND DEPRESSION: ICD-10-CM

## 2024-08-29 RX ORDER — ESCITALOPRAM OXALATE 10 MG/1
20 TABLET ORAL DAILY
Qty: 90 TABLET | Refills: 0 | Status: SHIPPED | OUTPATIENT
Start: 2024-08-29

## 2024-08-29 ASSESSMENT — ANXIETY QUESTIONNAIRES
1. FEELING NERVOUS, ANXIOUS, OR ON EDGE: MORE THAN HALF THE DAYS
7. FEELING AFRAID AS IF SOMETHING AWFUL MIGHT HAPPEN: SEVERAL DAYS
GAD7 TOTAL SCORE: 12
6. BECOMING EASILY ANNOYED OR IRRITABLE: SEVERAL DAYS
IF YOU CHECKED OFF ANY PROBLEMS ON THIS QUESTIONNAIRE, HOW DIFFICULT HAVE THESE PROBLEMS MADE IT FOR YOU TO DO YOUR WORK, TAKE CARE OF THINGS AT HOME, OR GET ALONG WITH OTHER PEOPLE: VERY DIFFICULT
3. WORRYING TOO MUCH ABOUT DIFFERENT THINGS: MORE THAN HALF THE DAYS
4. TROUBLE RELAXING: NEARLY EVERY DAY
2. NOT BEING ABLE TO STOP OR CONTROL WORRYING: MORE THAN HALF THE DAYS
5. BEING SO RESTLESS THAT IT IS HARD TO SIT STILL: SEVERAL DAYS

## 2024-08-29 ASSESSMENT — PATIENT HEALTH QUESTIONNAIRE - PHQ9
10. IF YOU CHECKED OFF ANY PROBLEMS, HOW DIFFICULT HAVE THESE PROBLEMS MADE IT FOR YOU TO DO YOUR WORK, TAKE CARE OF THINGS AT HOME, OR GET ALONG WITH OTHER PEOPLE: VERY DIFFICULT
7. TROUBLE CONCENTRATING ON THINGS, SUCH AS READING THE NEWSPAPER OR WATCHING TELEVISION: NOT AT ALL
4. FEELING TIRED OR HAVING LITTLE ENERGY: SEVERAL DAYS
1. LITTLE INTEREST OR PLEASURE IN DOING THINGS: SEVERAL DAYS
SUM OF ALL RESPONSES TO PHQ QUESTIONS 1-9: 5
9. THOUGHTS THAT YOU WOULD BE BETTER OFF DEAD, OR OF HURTING YOURSELF: NOT AT ALL
5. POOR APPETITE OR OVEREATING: SEVERAL DAYS
SUM OF ALL RESPONSES TO PHQ9 QUESTIONS 1 & 2: 2
8. MOVING OR SPEAKING SO SLOWLY THAT OTHER PEOPLE COULD HAVE NOTICED. OR THE OPPOSITE, BEING SO FIGETY OR RESTLESS THAT YOU HAVE BEEN MOVING AROUND A LOT MORE THAN USUAL: NOT AT ALL
3. TROUBLE FALLING OR STAYING ASLEEP: SEVERAL DAYS
SUM OF ALL RESPONSES TO PHQ QUESTIONS 1-9: 5
6. FEELING BAD ABOUT YOURSELF - OR THAT YOU ARE A FAILURE OR HAVE LET YOURSELF OR YOUR FAMILY DOWN: NOT AT ALL
2. FEELING DOWN, DEPRESSED OR HOPELESS: SEVERAL DAYS

## 2024-08-29 NOTE — PROGRESS NOTES
Chief Complaint   Patient presents with    Routine Prenatal Visit     Baby move: yes  Vaginal bleeding: no  Vaginal fluid: no  Contractions: no  Concerns: There are days baby is not moving around as much. Is that normal?      Vitals:    08/29/24 1036   BP: 103/67   Site: Right Upper Arm   Position: Sitting   Cuff Size: Medium Adult   Pulse: (!) 102   Resp: 18   Temp: 98 °F (36.7 °C)   TempSrc: Temporal   SpO2: 95%   Weight: 89.7 kg (197 lb 12.8 oz)   Height: 1.5 m (4' 11.06\")     \"Have you been to the ER, urgent care clinic since your last visit?  Hospitalized since your last visit?\"    NO    “Have you seen or consulted any other health care providers outside of [facility] since your last visit?”    NO            Click Here for Release of Records Request

## 2024-08-29 NOTE — PROGRESS NOTES
I discussed with the resident the medical history and the resident's findings on physical exam. I discussed with the resident the patient's diagnosis and agree with the plan of care.     29yo  at 34w0d by --/9    IUP: RH pos  low risk NIPT, Horizon 14 neg  anatomy okay, GTT+CBC ok, +tdap  Obesity: A1C 4.9, early GTT okay  on ASA, EFW 37th% on , growth scheduled   Depression/Anxiety: depression screening has improved some and pt reports doing better, has met with SW/CM, lots of family problems, is doing counseling, continue Lexapro 20mg  Hep B Non-Immune: ,     Estimated Date of Delivery: 10/10/24

## 2024-08-29 NOTE — PROGRESS NOTES
mins    Labs  No results found for this or any previous visit (from the past 12 hour(s)).    Assessment      Diagnosis Orders   1. Supervision of high risk pregnancy, antepartum        2. Anxiety and depression  escitalopram (LEXAPRO) 10 MG tablet        Plan   28 y.o.  at 34w0d by LMP (c/w 9w US) with obesity, PATTI Estimated Date of Delivery: 10/10/24 here for return OB visit.     SIUP:  -PNL: O+, Ab neg, first trim Hb 13.3, Hgb fractionation wnl, HepBSAg and Ab neg, Hep B core total Ab neg (NI), Hep C Ab NR, HIV/RPR NR, Rubella/VZV immune, Ucx no growth, Pap result NILM. GC/CT/Trich negative. Passed early 1' GTT and repeat GTT, A1c 4.9.  -Immunizations: Tdap 24, 1st Hep B 24, 24  -Ultrasound:   - Anatomy on 24, EFW 80%, AC 77%. Normal anatomy. Sub optimal views, follow up 24.  - Anatomy on 24, EFW 67%, AC 55%, HC 40%, FL 68%. Posterior placenta, LATRELL wnl, 3vc, suboptimal views, anatomy otherwise normal   - Repeat growth on 24 for suboptimal views: EFW 1249, normal anatomy but not optimally visualized   - Repeat Growth scheled for 24 with MFM, patient aware  -Genetic Testing: Normal, Patient informed of female fetus.   -Follow up: 9/10/24 with Dr. Billy     Pregnancy Complications:  GERD: Given information, recommended TUMs prn     Depression/Anxiety:  Improved. No SI today. Reports feeling some better after with starting therapy, but still often sad. PHQ-9 5 and ANDREW-7 12  No SI or HI today.  - Cont Lexapro 10mg qd   - Continue counseling appts    Insomnia: Improved, not taking medication. Continue to monitor     Obesity: Passed early 1' GTT and repeat GTT, A1c 4.9. Nulliparous.  - Continue ASA     Hx of Bleeding: No concerns today. Trace blood on UA at prior visit.     Hep B non-immune: S/p 1st dose 24, 2nd  24  - Needs final dose on or after 2024     PREGNANCY CONCERNS   ---------------------------------------  Continuity Provider: Wenceslao Jimenes,  Shmuel Gonzalez mgmt. in labor: TBD  Feeding: Both  Circ:  NA  ---------------------------------------    No orders of the defined types were placed in this encounter.    Labor precautions discussed, including: Regular painful contractions, lasting for greater than one hour, taking your breath away; any vaginal bleeding; any leakage of fluid; or absent or decreased fetal movement. Call M.D. on call if any of these symptoms or signs occur.    I have discussed the diagnosis with the patient and the intended plan as seen in the above orders.  The patient has received an after-visit summary and questions were answered concerning future plans.  I have discussed medication side effects and warnings with the patient as well. Informed pt to return to the office or go to the ER if she experiences vaginal bleeding, vaginal discharge, leaking of fluid, pelvic cramping.    Pt discussed with Dr. Tierney(attending physician)    Venkata Billy DO  Family Medicine Resident

## 2024-09-10 ENCOUNTER — OFFICE VISIT (OUTPATIENT)
Age: 29
End: 2024-09-10

## 2024-09-10 ENCOUNTER — ROUTINE PRENATAL (OUTPATIENT)
Age: 29
End: 2024-09-10

## 2024-09-10 VITALS
OXYGEN SATURATION: 96 % | TEMPERATURE: 98.4 F | HEART RATE: 92 BPM | WEIGHT: 200.4 LBS | SYSTOLIC BLOOD PRESSURE: 94 MMHG | HEIGHT: 59 IN | DIASTOLIC BLOOD PRESSURE: 60 MMHG | BODY MASS INDEX: 40.4 KG/M2 | RESPIRATION RATE: 30 BRPM

## 2024-09-10 DIAGNOSIS — N94.10 DYSPAREUNIA, FEMALE: ICD-10-CM

## 2024-09-10 DIAGNOSIS — Z78.9 NEED FOR FOLLOW-UP BY SOCIAL WORKER: Primary | ICD-10-CM

## 2024-09-10 DIAGNOSIS — R12 HEARTBURN: ICD-10-CM

## 2024-09-10 DIAGNOSIS — O09.90 SUPERVISION OF HIGH RISK PREGNANCY, ANTEPARTUM: Primary | ICD-10-CM

## 2024-09-10 LAB
C. TRACHOMATIS, EXTERNAL RESULT: NEGATIVE
HEP B, EXTERNAL RESULT: NEGATIVE
HIV, EXTERNAL RESULT: NORMAL
N. GONORRHOEAE, EXTERNAL RESULT: NEGATIVE
RPR, EXTERNAL RESULT: NORMAL

## 2024-09-10 PROCEDURE — 0502F SUBSEQUENT PRENATAL CARE: CPT

## 2024-09-10 RX ORDER — FAMOTIDINE 20 MG/1
20 TABLET, FILM COATED ORAL 2 TIMES DAILY
Qty: 60 TABLET | Refills: 3 | Status: SHIPPED | OUTPATIENT
Start: 2024-09-10

## 2024-09-11 LAB
HBV SURFACE AG SER QL: <0.1 INDEX
HBV SURFACE AG SER QL: NEGATIVE
HIV 1+2 AB+HIV1 P24 AG SERPL QL IA: NONREACTIVE
HIV 1/2 RESULT COMMENT: NORMAL
RPR SER QL: NONREACTIVE

## 2024-09-13 LAB
C TRACH RRNA SPEC QL NAA+PROBE: NEGATIVE
N GONORRHOEA RRNA SPEC QL NAA+PROBE: NEGATIVE
SPECIMEN SOURCE: NORMAL
T VAGINALIS RRNA SPEC QL NAA+PROBE: NEGATIVE

## 2024-09-18 ENCOUNTER — ROUTINE PRENATAL (OUTPATIENT)
Age: 29
End: 2024-09-18

## 2024-09-18 VITALS
TEMPERATURE: 98.6 F | WEIGHT: 202 LBS | HEIGHT: 59 IN | HEART RATE: 83 BPM | BODY MASS INDEX: 40.72 KG/M2 | RESPIRATION RATE: 18 BRPM | DIASTOLIC BLOOD PRESSURE: 63 MMHG | SYSTOLIC BLOOD PRESSURE: 101 MMHG | OXYGEN SATURATION: 96 %

## 2024-09-18 DIAGNOSIS — O09.90 SUPERVISION OF HIGH RISK PREGNANCY, ANTEPARTUM: Primary | ICD-10-CM

## 2024-09-18 LAB — GBS, EXTERNAL RESULT: NEGATIVE

## 2024-09-18 PROCEDURE — 0502F SUBSEQUENT PRENATAL CARE: CPT

## 2024-09-18 RX ORDER — PNV NO.95/FERROUS FUM/FOLIC AC 28MG-0.8MG
1 TABLET ORAL
Qty: 30 TABLET | Refills: 2 | Status: SHIPPED | OUTPATIENT
Start: 2024-09-18

## 2024-09-19 ENCOUNTER — ROUTINE PRENATAL (OUTPATIENT)
Age: 29
End: 2024-09-19
Payer: COMMERCIAL

## 2024-09-19 VITALS — DIASTOLIC BLOOD PRESSURE: 65 MMHG | HEART RATE: 87 BPM | SYSTOLIC BLOOD PRESSURE: 100 MMHG

## 2024-09-19 DIAGNOSIS — Z3A.40 40 WEEKS GESTATION OF PREGNANCY: Primary | ICD-10-CM

## 2024-09-19 PROCEDURE — 99214 OFFICE O/P EST MOD 30 MIN: CPT | Performed by: STUDENT IN AN ORGANIZED HEALTH CARE EDUCATION/TRAINING PROGRAM

## 2024-09-19 PROCEDURE — 76819 FETAL BIOPHYS PROFIL W/O NST: CPT | Performed by: STUDENT IN AN ORGANIZED HEALTH CARE EDUCATION/TRAINING PROGRAM

## 2024-09-19 PROCEDURE — 76816 OB US FOLLOW-UP PER FETUS: CPT | Performed by: STUDENT IN AN ORGANIZED HEALTH CARE EDUCATION/TRAINING PROGRAM

## 2024-09-22 LAB
BACTERIA SPEC CULT: NORMAL
SERVICE CMNT-IMP: NORMAL

## 2024-09-25 ENCOUNTER — ROUTINE PRENATAL (OUTPATIENT)
Age: 29
End: 2024-09-25

## 2024-09-25 VITALS
SYSTOLIC BLOOD PRESSURE: 106 MMHG | WEIGHT: 204 LBS | HEART RATE: 89 BPM | DIASTOLIC BLOOD PRESSURE: 65 MMHG | RESPIRATION RATE: 14 BRPM | BODY MASS INDEX: 41.2 KG/M2 | TEMPERATURE: 98.4 F | OXYGEN SATURATION: 96 %

## 2024-09-25 DIAGNOSIS — Z3A.37 37 WEEKS GESTATION OF PREGNANCY: Primary | ICD-10-CM

## 2024-09-25 DIAGNOSIS — F32.A ANXIETY AND DEPRESSION: ICD-10-CM

## 2024-09-25 DIAGNOSIS — O09.90 SUPERVISION OF HIGH RISK PREGNANCY, ANTEPARTUM: ICD-10-CM

## 2024-09-25 DIAGNOSIS — R12 HEARTBURN: ICD-10-CM

## 2024-09-25 DIAGNOSIS — F41.9 ANXIETY AND DEPRESSION: ICD-10-CM

## 2024-09-25 DIAGNOSIS — E66.9 OBESITY, UNSPECIFIED CLASSIFICATION, UNSPECIFIED OBESITY TYPE, UNSPECIFIED WHETHER SERIOUS COMORBIDITY PRESENT: ICD-10-CM

## 2024-09-25 PROCEDURE — 0502F SUBSEQUENT PRENATAL CARE: CPT

## 2024-09-28 ENCOUNTER — HOSPITAL ENCOUNTER (INPATIENT)
Facility: HOSPITAL | Age: 29
LOS: 3 days | Discharge: HOME | DRG: 560 | End: 2024-10-01
Attending: FAMILY MEDICINE | Admitting: OBSTETRICS & GYNECOLOGY
Payer: COMMERCIAL

## 2024-09-28 ENCOUNTER — ANESTHESIA EVENT (OUTPATIENT)
Facility: HOSPITAL | Age: 29
DRG: 560 | End: 2024-09-28
Payer: COMMERCIAL

## 2024-09-28 ENCOUNTER — ANESTHESIA (OUTPATIENT)
Facility: HOSPITAL | Age: 29
DRG: 560 | End: 2024-09-28
Payer: COMMERCIAL

## 2024-09-28 PROBLEM — Z37.9 NORMAL LABOR: Status: ACTIVE | Noted: 2024-09-28

## 2024-09-28 LAB
ABO + RH BLD: NORMAL
APPEARANCE UR: ABNORMAL
BACTERIA URNS QL MICRO: NEGATIVE /HPF
BASOPHILS # BLD: 0 K/UL (ref 0–0.1)
BASOPHILS NFR BLD: 0 % (ref 0–1)
BILIRUB UR QL: NEGATIVE
BLOOD GROUP ANTIBODIES SERPL: NORMAL
CAOX CRY URNS QL MICRO: ABNORMAL
COLOR UR: ABNORMAL
DIFFERENTIAL METHOD BLD: ABNORMAL
EOSINOPHIL # BLD: 0.1 K/UL (ref 0–0.4)
EOSINOPHIL NFR BLD: 1 % (ref 0–7)
EPITH CASTS URNS QL MICRO: ABNORMAL /LPF
ERYTHROCYTE [DISTWIDTH] IN BLOOD BY AUTOMATED COUNT: 12.9 % (ref 11.5–14.5)
GLUCOSE UR STRIP.AUTO-MCNC: NEGATIVE MG/DL
HCT VFR BLD AUTO: 39.2 % (ref 35–47)
HGB BLD-MCNC: 13.5 G/DL (ref 11.5–16)
HGB UR QL STRIP: ABNORMAL
IMM GRANULOCYTES # BLD AUTO: 0.1 K/UL (ref 0–0.04)
IMM GRANULOCYTES NFR BLD AUTO: 1 % (ref 0–0.5)
KETONES UR QL STRIP.AUTO: ABNORMAL MG/DL
LEUKOCYTE ESTERASE UR QL STRIP.AUTO: ABNORMAL
LYMPHOCYTES # BLD: 2.3 K/UL (ref 0.8–3.5)
LYMPHOCYTES NFR BLD: 18 % (ref 12–49)
MCH RBC QN AUTO: 29.6 PG (ref 26–34)
MCHC RBC AUTO-ENTMCNC: 34.4 G/DL (ref 30–36.5)
MCV RBC AUTO: 86 FL (ref 80–99)
MONOCYTES # BLD: 0.9 K/UL (ref 0–1)
MONOCYTES NFR BLD: 7 % (ref 5–13)
NEUTS SEG # BLD: 9.4 K/UL (ref 1.8–8)
NEUTS SEG NFR BLD: 73 % (ref 32–75)
NITRITE UR QL STRIP.AUTO: NEGATIVE
NRBC # BLD: 0 K/UL (ref 0–0.01)
NRBC BLD-RTO: 0 PER 100 WBC
PH UR STRIP: 6 (ref 5–8)
PLATELET # BLD AUTO: 267 K/UL (ref 150–400)
PMV BLD AUTO: 10 FL (ref 8.9–12.9)
PROT UR STRIP-MCNC: 100 MG/DL
RBC # BLD AUTO: 4.56 M/UL (ref 3.8–5.2)
RBC #/AREA URNS HPF: >100 /HPF (ref 0–5)
SP GR UR REFRACTOMETRY: >1.03 (ref 1–1.03)
SPECIMEN EXP DATE BLD: NORMAL
SPECIMEN HOLD: NORMAL
URINE CULTURE IF INDICATED: ABNORMAL
UROBILINOGEN UR QL STRIP.AUTO: 1 EU/DL (ref 0.2–1)
WBC # BLD AUTO: 12.9 K/UL (ref 3.6–11)
WBC URNS QL MICRO: ABNORMAL /HPF (ref 0–4)

## 2024-09-28 PROCEDURE — 7210000100 HC LABOR FEE PER 1 HR

## 2024-09-28 PROCEDURE — 86900 BLOOD TYPING SEROLOGIC ABO: CPT

## 2024-09-28 PROCEDURE — 1100000000 HC RM PRIVATE

## 2024-09-28 PROCEDURE — 86901 BLOOD TYPING SEROLOGIC RH(D): CPT

## 2024-09-28 PROCEDURE — 36415 COLL VENOUS BLD VENIPUNCTURE: CPT

## 2024-09-28 PROCEDURE — 6370000000 HC RX 637 (ALT 250 FOR IP)

## 2024-09-28 PROCEDURE — 81001 URINALYSIS AUTO W/SCOPE: CPT

## 2024-09-28 PROCEDURE — 2500000003 HC RX 250 WO HCPCS: Performed by: ANESTHESIOLOGY

## 2024-09-28 PROCEDURE — 85025 COMPLETE CBC W/AUTO DIFF WBC: CPT

## 2024-09-28 PROCEDURE — 2580000003 HC RX 258

## 2024-09-28 PROCEDURE — 86780 TREPONEMA PALLIDUM: CPT

## 2024-09-28 PROCEDURE — 51702 INSERT TEMP BLADDER CATH: CPT

## 2024-09-28 PROCEDURE — 6360000002 HC RX W HCPCS

## 2024-09-28 PROCEDURE — 86850 RBC ANTIBODY SCREEN: CPT

## 2024-09-28 PROCEDURE — G0378 HOSPITAL OBSERVATION PER HR: HCPCS

## 2024-09-28 PROCEDURE — G0379 DIRECT REFER HOSPITAL OBSERV: HCPCS

## 2024-09-28 RX ORDER — MAGNESIUM CARB/ALUMINUM HYDROX 105-160MG
30 TABLET,CHEWABLE ORAL DAILY PRN
Status: DISCONTINUED | OUTPATIENT
Start: 2024-09-28 | End: 2024-10-01 | Stop reason: HOSPADM

## 2024-09-28 RX ORDER — MISOPROSTOL 200 UG/1
400 TABLET ORAL PRN
Status: DISCONTINUED | OUTPATIENT
Start: 2024-09-28 | End: 2024-09-29

## 2024-09-28 RX ORDER — METHYLERGONOVINE MALEATE 0.2 MG/ML
200 INJECTION INTRAVENOUS PRN
Status: DISCONTINUED | OUTPATIENT
Start: 2024-09-28 | End: 2024-09-29

## 2024-09-28 RX ORDER — SODIUM CHLORIDE 9 MG/ML
25 INJECTION, SOLUTION INTRAVENOUS PRN
Status: DISCONTINUED | OUTPATIENT
Start: 2024-09-28 | End: 2024-09-29

## 2024-09-28 RX ORDER — TRANEXAMIC ACID 10 MG/ML
1000 INJECTION, SOLUTION INTRAVENOUS
Status: COMPLETED | OUTPATIENT
Start: 2024-09-28 | End: 2024-09-29

## 2024-09-28 RX ORDER — ONDANSETRON 4 MG/1
4 TABLET, ORALLY DISINTEGRATING ORAL EVERY 6 HOURS PRN
Status: DISCONTINUED | OUTPATIENT
Start: 2024-09-28 | End: 2024-09-29

## 2024-09-28 RX ORDER — ESCITALOPRAM OXALATE 10 MG/1
10 TABLET ORAL NIGHTLY
Status: DISCONTINUED | OUTPATIENT
Start: 2024-09-28 | End: 2024-10-01 | Stop reason: HOSPADM

## 2024-09-28 RX ORDER — SODIUM CHLORIDE, SODIUM LACTATE, POTASSIUM CHLORIDE, AND CALCIUM CHLORIDE .6; .31; .03; .02 G/100ML; G/100ML; G/100ML; G/100ML
500 INJECTION, SOLUTION INTRAVENOUS PRN
Status: DISCONTINUED | OUTPATIENT
Start: 2024-09-28 | End: 2024-09-29

## 2024-09-28 RX ORDER — SODIUM CHLORIDE 0.9 % (FLUSH) 0.9 %
5-40 SYRINGE (ML) INJECTION PRN
Status: DISCONTINUED | OUTPATIENT
Start: 2024-09-28 | End: 2024-09-29

## 2024-09-28 RX ORDER — ONDANSETRON 2 MG/ML
4 INJECTION INTRAMUSCULAR; INTRAVENOUS EVERY 6 HOURS PRN
Status: DISCONTINUED | OUTPATIENT
Start: 2024-09-28 | End: 2024-09-29

## 2024-09-28 RX ORDER — CARBOPROST TROMETHAMINE 250 UG/ML
250 INJECTION, SOLUTION INTRAMUSCULAR PRN
Status: DISCONTINUED | OUTPATIENT
Start: 2024-09-28 | End: 2024-09-29

## 2024-09-28 RX ORDER — SODIUM CHLORIDE, SODIUM LACTATE, POTASSIUM CHLORIDE, CALCIUM CHLORIDE 600; 310; 30; 20 MG/100ML; MG/100ML; MG/100ML; MG/100ML
INJECTION, SOLUTION INTRAVENOUS CONTINUOUS
Status: DISCONTINUED | OUTPATIENT
Start: 2024-09-28 | End: 2024-09-29

## 2024-09-28 RX ORDER — SODIUM CHLORIDE 0.9 % (FLUSH) 0.9 %
5-40 SYRINGE (ML) INJECTION EVERY 12 HOURS SCHEDULED
Status: DISCONTINUED | OUTPATIENT
Start: 2024-09-28 | End: 2024-09-29

## 2024-09-28 RX ORDER — FENTANYL/BUPIVACAINE/NS/PF 2-1250MCG
10 PLASTIC BAG, INJECTION (ML) INJECTION CONTINUOUS
Status: DISCONTINUED | OUTPATIENT
Start: 2024-09-28 | End: 2024-09-29

## 2024-09-28 RX ORDER — ACETAMINOPHEN 325 MG/1
650 TABLET ORAL EVERY 4 HOURS PRN
Status: DISCONTINUED | OUTPATIENT
Start: 2024-09-28 | End: 2024-09-29

## 2024-09-28 RX ORDER — FENTANYL CITRATE 50 UG/ML
INJECTION, SOLUTION INTRAMUSCULAR; INTRAVENOUS
Status: DISCONTINUED | OUTPATIENT
Start: 2024-09-28 | End: 2024-09-29 | Stop reason: SDUPTHER

## 2024-09-28 RX ORDER — LIDOCAINE HYDROCHLORIDE AND EPINEPHRINE 15; 5 MG/ML; UG/ML
INJECTION, SOLUTION EPIDURAL
Status: DISCONTINUED | OUTPATIENT
Start: 2024-09-28 | End: 2024-09-29 | Stop reason: SDUPTHER

## 2024-09-28 RX ORDER — TERBUTALINE SULFATE 1 MG/ML
0.25 INJECTION, SOLUTION SUBCUTANEOUS
Status: DISCONTINUED | OUTPATIENT
Start: 2024-09-28 | End: 2024-09-29

## 2024-09-28 RX ORDER — NALOXONE HYDROCHLORIDE 0.4 MG/ML
INJECTION, SOLUTION INTRAMUSCULAR; INTRAVENOUS; SUBCUTANEOUS PRN
Status: DISCONTINUED | OUTPATIENT
Start: 2024-09-28 | End: 2024-09-29

## 2024-09-28 RX ADMIN — OXYTOCIN 2 MILLI-UNITS/MIN: 10 INJECTION, SOLUTION INTRAMUSCULAR; INTRAVENOUS at 23:05

## 2024-09-28 RX ADMIN — FENTANYL CITRATE 75 MCG: 50 INJECTION, SOLUTION INTRAMUSCULAR; INTRAVENOUS at 17:07

## 2024-09-28 RX ADMIN — SODIUM CHLORIDE, SODIUM LACTATE, POTASSIUM CHLORIDE, AND CALCIUM CHLORIDE 1000 ML: .6; .31; .03; .02 INJECTION, SOLUTION INTRAVENOUS at 23:16

## 2024-09-28 RX ADMIN — Medication 10 ML/HR: at 17:17

## 2024-09-28 RX ADMIN — FENTANYL CITRATE 25 MCG: 50 INJECTION, SOLUTION INTRAMUSCULAR; INTRAVENOUS at 16:59

## 2024-09-28 RX ADMIN — ESCITALOPRAM OXALATE 10 MG: 10 TABLET ORAL at 22:20

## 2024-09-28 RX ADMIN — SODIUM CHLORIDE, POTASSIUM CHLORIDE, SODIUM LACTATE AND CALCIUM CHLORIDE: 600; 310; 30; 20 INJECTION, SOLUTION INTRAVENOUS at 15:23

## 2024-09-28 RX ADMIN — SODIUM CHLORIDE, POTASSIUM CHLORIDE, SODIUM LACTATE AND CALCIUM CHLORIDE: 600; 310; 30; 20 INJECTION, SOLUTION INTRAVENOUS at 17:20

## 2024-09-28 RX ADMIN — LIDOCAINE HYDROCHLORIDE AND EPINEPHRINE 3.5 ML: 15; 5 INJECTION, SOLUTION EPIDURAL at 17:07

## 2024-09-28 RX ADMIN — SODIUM CHLORIDE, POTASSIUM CHLORIDE, SODIUM LACTATE AND CALCIUM CHLORIDE 1000 ML: 600; 310; 30; 20 INJECTION, SOLUTION INTRAVENOUS at 23:27

## 2024-09-28 RX ADMIN — SODIUM CHLORIDE, POTASSIUM CHLORIDE, SODIUM LACTATE AND CALCIUM CHLORIDE: 600; 310; 30; 20 INJECTION, SOLUTION INTRAVENOUS at 16:24

## 2024-09-28 NOTE — ANESTHESIA PRE PROCEDURE
Josie Oneill APRN - CNM        Or   • ondansetron (ZOFRAN-ODT) disintegrating tablet 4 mg  4 mg Oral Q6H PRN Josie Oneill APRN - CNM       • oxytocin (PITOCIN) 30 units in 500 mL infusion  87.3 deion-units/min IntraVENous Continuous PRN Josie Oneill APRN - CNM        And   • oxytocin (PITOCIN) 10 unit bolus from the bag  10 Units IntraVENous PRN Josie Oneill APRN - CNM       • methylergonovine (METHERGINE) injection 200 mcg  200 mcg IntraMUSCular PRN Josie Oneill APRN - CNM       • carboprost (HEMABATE) injection 250 mcg  250 mcg IntraMUSCular PRN Josie Oneill APRN - CNM       • miSOPROStol (CYTOTEC) tablet 400 mcg  400 mcg Buccal PRN Josie Oneill APRN - CNM       • tranexamic acid-NaCl IVPB premix 1,000 mg  1,000 mg IntraVENous Once PRN Josie Oneill APRN - CNM       • acetaminophen (TYLENOL) tablet 650 mg  650 mg Oral Q4H PRN Josie Oneill APRN - CNM       • benzocaine-menthol (DERMOPLAST) 20-0.5 % spray   Topical PRN Josie Oneill APRN - CNM       • escitalopram (LEXAPRO) tablet 10 mg  10 mg Oral Nightly Josie Oneill APRN - CNM       • fentaNYL 2 mcg/mL BUPivacaine 0.125% in sodium chloride 0.9% 100 mL epidural infusion  10 mL/hr Epidural Continuous Regis Leyva DO       • naloxone 0.4 mg in 10 mL sodium chloride syringe   IntraVENous PRN Regis Leyva, DO       • ondansetron (ZOFRAN) injection 4 mg  4 mg IntraVENous Q6H PRN Regis Leyva, DO           Allergies:  No Known Allergies    Problem List:    Patient Active Problem List   Diagnosis Code   • Normal labor O80, Z37.9       Past Medical History:        Diagnosis Date   • Mental disorder        Past Surgical History:  History reviewed. No pertinent surgical history.    Social History:    Social History     Tobacco Use   • Smoking status: Never   • Smokeless tobacco: Never   Substance Use Topics   • Alcohol use: Never                                Counseling given: Not Answered      Vital Signs

## 2024-09-28 NOTE — PROGRESS NOTES
1240 - Patient arrived to unit from ED with reports of bleeding for 2 days. Patient states it goes between bright red and dark red. She states the bleeding is like the first day of her period. She also states she has been ingrid since this morning and they are about 10 minutes apart.     1255 - TISHA Oneill CNM notified of patient's arrival.     1340 - Speculum exam attempted by TISHA Oneill CNM, but patient unable to tolerate.     1348 - Speculum exam by Dr. Montana. Bloody show noted. SVE by Dr. Montana. Patient 5/80/-2.     1517 - This RN spoke with TISHA Oneill CNM. Patient allowed to be intermittent monitoring. Spot check baby's HR q 30 mins.    1523 - Patient requesting epidural. Bolus started.     1640 - Dr. Leyva in room to place epidural.    1648 - Time out.    1707 - Test dose.     1821 - SVE by TISHA Oneill CNM. Patient 6-7/90/-3.    1900 - Verbal shift change report given to PENNY Orantes RN (oncoming nurse) by KRISTEL Oneill RN (offgoing nurse). Report included the following information Nurse Handoff Report.

## 2024-09-28 NOTE — PROGRESS NOTES
Labor Note    Jaylene Tobin  [id number]  1995   38w2d      S:  Coping well with contractions s/p epidural placement. Denies c/o at this time.FOB remains at bedside.    O:    BP (!) 115/57   Pulse 72   Temp 98.2 °F (36.8 °C) (Oral)   Resp 17   Ht 1.499 m (4' 11\")   Wt 92.5 kg (204 lb)   LMP 2024 (Approximate)   SpO2 93%   BMI 41.20 kg/m²      VSS    SVE:6-/-3, vertex, membranes intact    FHT   Baseline:140 bpm  Moderate variability  Accels: present  Decels: variable, periodic  Ctx/[place]: 2-5, moderate to palpation, resting tone soft between    A/P:  28 y.o.  @ 38w2d admitted with spontaneous labor. Pregnancy course complicated by maternal obesity.  - GBS Neg  - CAT I FHT    SVE performed with pt consent. --3, vertex  Encouraged frequent position changes with use of peanut ball.   Discussed possible AROM at next SVE if pt agreeable. Desires rest period.      - FWB: CE[facility]/[place]  - Labor: Expectant management  - Labor Pain management - Epidural infusing  - Anticipate .      Signed:    SARAH Johnson

## 2024-09-28 NOTE — ANESTHESIA PROCEDURE NOTES
CSE Block    Patient location during procedure: OB  Start time: 9/28/2024 4:48 PM  End time: 9/28/2024 5:14 PM  Reason for block: labor epidural  Staffing  Performed: anesthesiologist   Anesthesiologist: Regis Leyva DO  Performed by: Regis Leyva DO  Authorized by: Regis Leyva DO    CSE  Patient position: sitting  Prep: Betadine  Patient monitoring: cardiac monitor  Approach: midline  Provider prep: mask and sterile gloves  Spinal Needle  Needle type: pencil-tip (Sroaya)   Needle gauge: 27 G  Needle length: 4.75 in  Epidural Needle  Injection technique: ROGERIO air  Epidural needle type: Gil.   Location: lumbar (1-5)  Catheter  Catheter type: end hole  Catheter size: 20G.  Catheter at skin depth: 9 cm  Test dose: negative  Assessment  Hemodynamics: stable  Additional Notes  Unable to palpate landmarks.   Patient had extreme difficulty getting into optimum position and staying still. Used  throughout, but still could not instruct patient about ideal body position and staying still.  Preanesthetic Checklist  Completed: patient identified, IV checked, site marked, risks and benefits discussed, surgical/procedural consents, equipment checked, pre-op evaluation, timeout performed, anesthesia consent given, oxygen available, monitors applied/VS acknowledged and blood product R/B/A discussed and consented

## 2024-09-28 NOTE — H&P
History and Physical    Patient: Jaylene Tobin MRN: [id number]  SSN: [id number]    YOB: 1995  Age: 28 y.o.  Sex: female      Subjective:      Jaylene Tobin is a 28 y.o. female who is a  @ 38w2d here with reports of bright red vaginal bleeding x 2 days. Also reports occasional uterine contractions that started x 1 day prior and notes ctx are increasing in severity. Endorses +FM. Denies LOF, HA, visual changes, new swelling and epigastric pain. Pregnancy course complicated by maternal obesity.  GBS is negative.  See prenatal record for detail.    Past Medical History:   Diagnosis Date    Mental disorder      No past surgical history on file.   No family history on file.  Social History     Tobacco Use    Smoking status: Never    Smokeless tobacco: Never   Substance Use Topics    Alcohol use: Never      Prior to Admission medications    Medication Sig Start Date End Date Taking? Authorizing Provider   Prenatal Vit-Fe Fumarate-FA (PRENATAL VITAMINS) 28-0.8 MG TABS Take 1 tablet by mouth daily (with breakfast) 24  Yes Venkata Billy DO   famotidine (PEPCID) 20 MG tablet Take 1 tablet by mouth 2 times daily 9/10/24  Yes Venkata Billy DO   escitalopram (LEXAPRO) 10 MG tablet Take 2 tablets by mouth daily 24  Yes Venkata Billy DO   aspirin 81 MG EC tablet Take 1 tablet by mouth daily 7/3/24  Yes Nayeli Mi MD   doxylamine-pyridoxine 10-10 MG TBEC Take 10 mg by mouth daily  Patient not taking: Reported on 3/13/2024 2/16/24   Emerson Hassan MD        No Known Allergies    Review of Systems:  - Negative for: fevers, chills, lightheadedness, dizziness, blurry/changes to vision, sore throat, CP, SOB, palpitations, RUQ pain, epigastric pain, rash, anxiety/depression more than baseline, nausea/vomiting  - positive for: above     OB ROS:  - negative for: vaginal discharge, loss of fluid  - positive for: vaginal bleeding,fetal movement, contractions       Objective:

## 2024-09-29 PROCEDURE — 0KQM0ZZ REPAIR PERINEUM MUSCLE, OPEN APPROACH: ICD-10-PCS | Performed by: OBSTETRICS & GYNECOLOGY

## 2024-09-29 PROCEDURE — 2580000003 HC RX 258

## 2024-09-29 PROCEDURE — 0UQMXZZ REPAIR VULVA, EXTERNAL APPROACH: ICD-10-PCS | Performed by: OBSTETRICS & GYNECOLOGY

## 2024-09-29 PROCEDURE — 59400 OBSTETRICAL CARE: CPT | Performed by: STUDENT IN AN ORGANIZED HEALTH CARE EDUCATION/TRAINING PROGRAM

## 2024-09-29 PROCEDURE — 6370000000 HC RX 637 (ALT 250 FOR IP)

## 2024-09-29 PROCEDURE — 3700000156 HC EPIDURAL ANESTHESIA: Performed by: ANESTHESIOLOGY

## 2024-09-29 PROCEDURE — 6360000002 HC RX W HCPCS

## 2024-09-29 PROCEDURE — 10907ZC DRAINAGE OF AMNIOTIC FLUID, THERAPEUTIC FROM PRODUCTS OF CONCEPTION, VIA NATURAL OR ARTIFICIAL OPENING: ICD-10-PCS | Performed by: OBSTETRICS & GYNECOLOGY

## 2024-09-29 PROCEDURE — 7220000101 HC DELIVERY VAGINAL/SINGLE

## 2024-09-29 PROCEDURE — 76815 OB US LIMITED FETUS(S): CPT

## 2024-09-29 PROCEDURE — 2500000003 HC RX 250 WO HCPCS

## 2024-09-29 PROCEDURE — 1120000000 HC RM PRIVATE OB

## 2024-09-29 PROCEDURE — 7210000100 HC LABOR FEE PER 1 HR

## 2024-09-29 PROCEDURE — 94761 N-INVAS EAR/PLS OXIMETRY MLT: CPT

## 2024-09-29 PROCEDURE — 88307 TISSUE EXAM BY PATHOLOGIST: CPT

## 2024-09-29 PROCEDURE — 4A1HXCZ MONITORING OF PRODUCTS OF CONCEPTION, CARDIAC RATE, EXTERNAL APPROACH: ICD-10-PCS | Performed by: OBSTETRICS & GYNECOLOGY

## 2024-09-29 PROCEDURE — 00HU33Z INSERTION OF INFUSION DEVICE INTO SPINAL CANAL, PERCUTANEOUS APPROACH: ICD-10-PCS | Performed by: OBSTETRICS & GYNECOLOGY

## 2024-09-29 PROCEDURE — 2500000003 HC RX 250 WO HCPCS: Performed by: ANESTHESIOLOGY

## 2024-09-29 RX ORDER — SODIUM CHLORIDE, SODIUM LACTATE, POTASSIUM CHLORIDE, AND CALCIUM CHLORIDE .6; .31; .03; .02 G/100ML; G/100ML; G/100ML; G/100ML
500 INJECTION, SOLUTION INTRAVENOUS ONCE
Status: COMPLETED | OUTPATIENT
Start: 2024-09-29 | End: 2024-09-29

## 2024-09-29 RX ORDER — DOCUSATE SODIUM 100 MG/1
100 CAPSULE, LIQUID FILLED ORAL 2 TIMES DAILY
Status: DISCONTINUED | OUTPATIENT
Start: 2024-09-29 | End: 2024-10-01 | Stop reason: HOSPADM

## 2024-09-29 RX ORDER — SODIUM CHLORIDE 0.9 % (FLUSH) 0.9 %
5-40 SYRINGE (ML) INJECTION PRN
Status: DISCONTINUED | OUTPATIENT
Start: 2024-09-29 | End: 2024-10-01 | Stop reason: HOSPADM

## 2024-09-29 RX ORDER — OXYTOCIN 10 [USP'U]/ML
INJECTION, SOLUTION INTRAMUSCULAR; INTRAVENOUS
Status: COMPLETED
Start: 2024-09-29 | End: 2024-09-29

## 2024-09-29 RX ORDER — TRANEXAMIC ACID 100 MG/ML
INJECTION, SOLUTION INTRAVENOUS
Status: DISCONTINUED
Start: 2024-09-29 | End: 2024-09-29 | Stop reason: WASHOUT

## 2024-09-29 RX ORDER — ONDANSETRON 2 MG/ML
4 INJECTION INTRAMUSCULAR; INTRAVENOUS EVERY 6 HOURS PRN
Status: DISCONTINUED | OUTPATIENT
Start: 2024-09-29 | End: 2024-10-01 | Stop reason: HOSPADM

## 2024-09-29 RX ORDER — ONDANSETRON 4 MG/1
4 TABLET, ORALLY DISINTEGRATING ORAL EVERY 6 HOURS PRN
Status: DISCONTINUED | OUTPATIENT
Start: 2024-09-29 | End: 2024-10-01 | Stop reason: HOSPADM

## 2024-09-29 RX ORDER — SODIUM CHLORIDE 9 MG/ML
INJECTION, SOLUTION INTRAVENOUS PRN
Status: DISCONTINUED | OUTPATIENT
Start: 2024-09-29 | End: 2024-10-01 | Stop reason: HOSPADM

## 2024-09-29 RX ORDER — ACETAMINOPHEN 500 MG
1000 TABLET ORAL EVERY 8 HOURS SCHEDULED
Status: DISCONTINUED | OUTPATIENT
Start: 2024-09-29 | End: 2024-10-01 | Stop reason: HOSPADM

## 2024-09-29 RX ORDER — IBUPROFEN 800 MG/1
800 TABLET, FILM COATED ORAL EVERY 8 HOURS SCHEDULED
Status: DISCONTINUED | OUTPATIENT
Start: 2024-09-29 | End: 2024-10-01 | Stop reason: HOSPADM

## 2024-09-29 RX ORDER — SODIUM CHLORIDE 0.9 % (FLUSH) 0.9 %
5-40 SYRINGE (ML) INJECTION EVERY 12 HOURS SCHEDULED
Status: DISCONTINUED | OUTPATIENT
Start: 2024-09-29 | End: 2024-09-29 | Stop reason: ALTCHOICE

## 2024-09-29 RX ORDER — LANOLIN/MINERAL OIL
LOTION (ML) TOPICAL PRN
Status: DISCONTINUED | OUTPATIENT
Start: 2024-09-29 | End: 2024-10-01 | Stop reason: HOSPADM

## 2024-09-29 RX ADMIN — OXYTOCIN 87.3 MILLI-UNITS/MIN: 10 INJECTION, SOLUTION INTRAMUSCULAR; INTRAVENOUS at 04:25

## 2024-09-29 RX ADMIN — ACETAMINOPHEN 1000 MG: 500 TABLET ORAL at 15:12

## 2024-09-29 RX ADMIN — ESCITALOPRAM OXALATE 10 MG: 10 TABLET ORAL at 20:45

## 2024-09-29 RX ADMIN — ACETAMINOPHEN 1000 MG: 500 TABLET ORAL at 05:37

## 2024-09-29 RX ADMIN — DOCUSATE SODIUM 100 MG: 100 CAPSULE, LIQUID FILLED ORAL at 20:45

## 2024-09-29 RX ADMIN — AMPICILLIN SODIUM 2000 MG: 2 INJECTION, POWDER, FOR SOLUTION INTRAMUSCULAR; INTRAVENOUS at 06:48

## 2024-09-29 RX ADMIN — IBUPROFEN 800 MG: 800 TABLET ORAL at 15:12

## 2024-09-29 RX ADMIN — SODIUM CHLORIDE, POTASSIUM CHLORIDE, SODIUM LACTATE AND CALCIUM CHLORIDE 500 ML: 600; 310; 30; 20 INJECTION, SOLUTION INTRAVENOUS at 06:44

## 2024-09-29 RX ADMIN — IBUPROFEN 800 MG: 800 TABLET ORAL at 05:39

## 2024-09-29 RX ADMIN — ACETAMINOPHEN 1000 MG: 500 TABLET ORAL at 23:08

## 2024-09-29 RX ADMIN — GENTAMICIN SULFATE 331.6 MG: 40 INJECTION, SOLUTION INTRAMUSCULAR; INTRAVENOUS at 06:52

## 2024-09-29 RX ADMIN — OXYTOCIN 10 UNITS: 10 INJECTION, SOLUTION INTRAMUSCULAR; INTRAVENOUS at 04:23

## 2024-09-29 RX ADMIN — TRANEXAMIC ACID 1000 MG: 10 INJECTION, SOLUTION INTRAVENOUS at 04:33

## 2024-09-29 RX ADMIN — Medication 10 ML/HR: at 01:31

## 2024-09-29 RX ADMIN — IBUPROFEN 800 MG: 800 TABLET ORAL at 23:08

## 2024-09-29 RX ADMIN — MISOPROSTOL 400 MCG: 200 TABLET ORAL at 04:27

## 2024-09-29 ASSESSMENT — PAIN DESCRIPTION - ORIENTATION: ORIENTATION: LOWER

## 2024-09-29 ASSESSMENT — PAIN DESCRIPTION - DESCRIPTORS
DESCRIPTORS: DISCOMFORT;SORE;TENDER
DESCRIPTORS: SORE

## 2024-09-29 ASSESSMENT — PAIN DESCRIPTION - LOCATION
LOCATION: ABDOMEN;PERINEUM
LOCATION: PERINEUM

## 2024-09-29 ASSESSMENT — PAIN SCALES - GENERAL
PAINLEVEL_OUTOF10: 6
PAINLEVEL_OUTOF10: 8

## 2024-09-29 ASSESSMENT — PAIN - FUNCTIONAL ASSESSMENT: PAIN_FUNCTIONAL_ASSESSMENT: ACTIVITIES ARE NOT PREVENTED

## 2024-09-29 NOTE — PROGRESS NOTES
1900 Report received from KRISTEL Oneill RN at the bedside. Pt resting comfortably with eyes closed. No complaints at this time. Assessment completed. FOB at the bedside.     0216 Patient actively pushing.  RN remains in continuous attendance at the bedside.  Assessment & evaluation of fetal heart rate ongoing via continuous EFM.     0220 TISHA Oneill CNM updated on pt status following practice pushes.    0233 TISHA Oneill CNM at the bedside.    0416 RN remained at bedside throughout pushing.  EFM continuously assessed.  Vaginal delivery of viable infant.     0630 TISHA Oneill CNM notified pt temp is 102.8 at this time. Order for abx received. Pt resting appears comfortable with eyes closed. Vitals stable.    0638 Will give 500 cc bolus per TISHA Oneill CNM    0655 Uterus deviated and +1. Straight cath performed by this RN. 300 CC emptied from bladder.

## 2024-09-29 NOTE — L&D DELIVERY NOTE
Jaylene, 27 yo  @ 38w3d admitted with spontaneous labor. She labored and progressed to complete. After laboring down to promote fetal descent, she pushed with contractions over two hours.  of live female  in DB position. Head, shoulders delivered with ease, no nuchal cord. Body followed with ease and lifted to mother's abdomen for drying and stimulation. APGAR'S 8/9.   After one minute of delayed cord clamping, cord was double clamped by CNM and cut by FOB. [place] transferred to [facility] for nursery evaluation at this time, due to meconium stained fluid. Cord blood collected. Placenta delivered spontaneously, appears intact on inspection, meconium stained with three vessel cord.Placenta sent to pathology. IV infiltrated and non-functioning at this time. IM pitocin administered. Brisk BRB noted, fundus firm with massage. Misoprostol 400 mcg administered sublingually. IV replaced by primary RN and TXA given IV for prophylaxis. Fundus firm on palpation.  mL.   Vulva, vagina and perineum inspected. Bilateral 2nd degree lacerations identified and re-approximated using 2-0 vicryl. Right labial laceration also identified and re-approximated using 4-0 vicryl. [place] swaddled and resting in bassinet while Jaylene rested during repair. Mother and FOB happy with birth experience.      Nii Tobin, Female Jaylene [[id number]]      Labor Events     Labor: No   Steroids: None  Cervical Ripening Date/Time:      Antibiotics Received during Labor: No  Rupture Date/Time:  24 23:20:00   Rupture Type: AROM, Intact  Fluid Color: Meconium  Fluid Odor: None  Induction: None       Anesthesia    Method: Epidural       Labor Event Times      Labor onset date/time:        Dilation complete date/time:  24 23:20:00 EDT     Start pushing date/time:  2024 02:16:00   Decision date/time (emergent ):            Delivery Details      Delivery Date: 24 Delivery Time: 04:16:45   Delivery

## 2024-09-29 NOTE — PROGRESS NOTES
Labor Note    Jaylene Tobin  [id number]  1995   38w2d      S:  Coping well with irregular contractions. Comfortable with epidural. Reports occ vaginal pressure.    O:    BP (!) 98/59   Pulse 69   Temp 98 °F (36.7 °C) (Oral)   Resp 18   Ht 1.499 m (4' 11\")   Wt 92.5 kg (204 lb)   LMP 2024 (Approximate)   SpO2 97%   BMI 41.20 kg/m²        SVE: /-3, vertex, Membranes intact    A/P:  28 y.o.  @ 38w2d admitted with spontaneous labor.   - GBS Neg  - CAT I FHT    SVE performed with pt consent, exam unchanged from primary RN recent exam.  Rec made for pitocin augmentation with r/b/a via . Pt verbalized understanding.  Repositioned to high wheeler's with peanut ball.       - FWB: CEFM/[place]  - Labor course Pitocin per unit protocol  - Pain control -Epidural infusing per protocol  - Anticipate .      Signed:    GERMANIA Johnson-HE      24 2924 Re-evaluation:  CNM called to bedside for FHR deceleration. FHR 70's on CNM arrival to bedside.   Pt repositioned to right lateral position with no improvement to FHT. Repositioned to left lateral with no change to FHR. Pitocin d/c'd at this time.   Dr. Mayfield called to bedside.  SVE performed with pt consent. 10/100/-2, vertex. AROM performed with pt consent with moderate thin meconium returned and immediate improvement of FHR to 140-150's.  Rec made to labor down for fetal descent. Pt agreeable.    Will start pushing with pt reports of constant rectal pressure.    cEFM/[place]  Labor:Expectant management  Labor pain: Epidural infusing per protocol  Anticipate

## 2024-09-29 NOTE — PROGRESS NOTES
This CNM notified of patient's elevated temperature s/p  with Motrin/Tylenol PP dose, now 102.8 orally.  Will order IV abx prophylaxis at this time.  Report given to oncoming resident at this time.    Ampicillin 2g IV  Gentamicin 5mg/kg IV once  Ice packs to axillary and groin  VS per unit protocol.  Continue routine postpartum care.    GERMANIA Johnson-HE

## 2024-09-29 NOTE — PROGRESS NOTES
0700 SBAR rec'd by PENNY Orantes RN.    0715 Patient resting soundly. Oral temp 99.9.    0830 Patient transferred to .  Bedside SBAR given to Cassidy CANTOR.

## 2024-09-30 LAB
BASOPHILS # BLD: 0 K/UL (ref 0–0.1)
BASOPHILS NFR BLD: 0 % (ref 0–1)
DIFFERENTIAL METHOD BLD: ABNORMAL
EOSINOPHIL # BLD: 0.2 K/UL (ref 0–0.4)
EOSINOPHIL NFR BLD: 1 % (ref 0–7)
ERYTHROCYTE [DISTWIDTH] IN BLOOD BY AUTOMATED COUNT: 13.2 % (ref 11.5–14.5)
HCT VFR BLD AUTO: 30.9 % (ref 35–47)
HGB BLD-MCNC: 10.3 G/DL (ref 11.5–16)
IMM GRANULOCYTES # BLD AUTO: 0.1 K/UL (ref 0–0.04)
IMM GRANULOCYTES NFR BLD AUTO: 1 % (ref 0–0.5)
LYMPHOCYTES # BLD: 2.3 K/UL (ref 0.8–3.5)
LYMPHOCYTES NFR BLD: 19 % (ref 12–49)
MCH RBC QN AUTO: 29.3 PG (ref 26–34)
MCHC RBC AUTO-ENTMCNC: 33.3 G/DL (ref 30–36.5)
MCV RBC AUTO: 88 FL (ref 80–99)
MONOCYTES # BLD: 0.5 K/UL (ref 0–1)
MONOCYTES NFR BLD: 4 % (ref 5–13)
NEUTS SEG # BLD: 9.3 K/UL (ref 1.8–8)
NEUTS SEG NFR BLD: 75 % (ref 32–75)
NRBC # BLD: 0 K/UL (ref 0–0.01)
NRBC BLD-RTO: 0 PER 100 WBC
PLATELET # BLD AUTO: 215 K/UL (ref 150–400)
PMV BLD AUTO: 10.2 FL (ref 8.9–12.9)
RBC # BLD AUTO: 3.51 M/UL (ref 3.8–5.2)
T PALLIDUM AB SER QL IA: NON REACTIVE
WBC # BLD AUTO: 12.4 K/UL (ref 3.6–11)

## 2024-09-30 PROCEDURE — 99024 POSTOP FOLLOW-UP VISIT: CPT | Performed by: STUDENT IN AN ORGANIZED HEALTH CARE EDUCATION/TRAINING PROGRAM

## 2024-09-30 PROCEDURE — 6370000000 HC RX 637 (ALT 250 FOR IP): Performed by: FAMILY MEDICINE

## 2024-09-30 PROCEDURE — 94761 N-INVAS EAR/PLS OXIMETRY MLT: CPT

## 2024-09-30 PROCEDURE — 6370000000 HC RX 637 (ALT 250 FOR IP)

## 2024-09-30 PROCEDURE — 36415 COLL VENOUS BLD VENIPUNCTURE: CPT

## 2024-09-30 PROCEDURE — 1120000000 HC RM PRIVATE OB

## 2024-09-30 PROCEDURE — 6360000002 HC RX W HCPCS

## 2024-09-30 PROCEDURE — 2580000003 HC RX 258

## 2024-09-30 PROCEDURE — 85025 COMPLETE CBC W/AUTO DIFF WBC: CPT

## 2024-09-30 RX ORDER — HYDROCODONE BITARTRATE AND ACETAMINOPHEN 5; 325 MG/1; MG/1
1 TABLET ORAL ONCE
Status: COMPLETED | OUTPATIENT
Start: 2024-09-30 | End: 2024-09-30

## 2024-09-30 RX ORDER — SIMETHICONE 80 MG
80 TABLET,CHEWABLE ORAL EVERY 6 HOURS PRN
Status: DISCONTINUED | OUTPATIENT
Start: 2024-09-30 | End: 2024-10-01 | Stop reason: HOSPADM

## 2024-09-30 RX ADMIN — AMPICILLIN SODIUM 2000 MG: 2 INJECTION, POWDER, FOR SOLUTION INTRAMUSCULAR; INTRAVENOUS at 12:13

## 2024-09-30 RX ADMIN — SIMETHICONE 80 MG: 80 TABLET, CHEWABLE ORAL at 20:07

## 2024-09-30 RX ADMIN — AMPICILLIN SODIUM 2000 MG: 2 INJECTION, POWDER, FOR SOLUTION INTRAMUSCULAR; INTRAVENOUS at 17:39

## 2024-09-30 RX ADMIN — IBUPROFEN 800 MG: 800 TABLET ORAL at 23:17

## 2024-09-30 RX ADMIN — AMPICILLIN SODIUM 2000 MG: 2 INJECTION, POWDER, FOR SOLUTION INTRAMUSCULAR; INTRAVENOUS at 12:10

## 2024-09-30 RX ADMIN — ACETAMINOPHEN 1000 MG: 500 TABLET ORAL at 23:38

## 2024-09-30 RX ADMIN — IBUPROFEN 800 MG: 800 TABLET ORAL at 15:00

## 2024-09-30 RX ADMIN — ACETAMINOPHEN 1000 MG: 500 TABLET ORAL at 07:05

## 2024-09-30 RX ADMIN — HYDROCODONE BITARTRATE AND ACETAMINOPHEN 1 TABLET: 5; 325 TABLET ORAL at 19:53

## 2024-09-30 RX ADMIN — ESCITALOPRAM OXALATE 10 MG: 10 TABLET ORAL at 19:54

## 2024-09-30 RX ADMIN — AMPICILLIN SODIUM 2000 MG: 2 INJECTION, POWDER, FOR SOLUTION INTRAMUSCULAR; INTRAVENOUS at 23:21

## 2024-09-30 RX ADMIN — GENTAMICIN SULFATE 340 MG: 40 INJECTION, SOLUTION INTRAMUSCULAR; INTRAVENOUS at 10:25

## 2024-09-30 RX ADMIN — IBUPROFEN 800 MG: 800 TABLET ORAL at 07:05

## 2024-09-30 RX ADMIN — DOCUSATE SODIUM 100 MG: 100 CAPSULE, LIQUID FILLED ORAL at 19:54

## 2024-09-30 RX ADMIN — ACETAMINOPHEN 1000 MG: 500 TABLET ORAL at 14:59

## 2024-09-30 RX ADMIN — DOCUSATE SODIUM 100 MG: 100 CAPSULE, LIQUID FILLED ORAL at 08:44

## 2024-09-30 ASSESSMENT — PAIN DESCRIPTION - ORIENTATION
ORIENTATION: LOWER
ORIENTATION: LOWER

## 2024-09-30 ASSESSMENT — PAIN SCALES - GENERAL
PAINLEVEL_OUTOF10: 8
PAINLEVEL_OUTOF10: 8
PAINLEVEL_OUTOF10: 6

## 2024-09-30 ASSESSMENT — PAIN DESCRIPTION - LOCATION
LOCATION: ABDOMEN
LOCATION: ABDOMEN
LOCATION: ABDOMEN;BACK

## 2024-09-30 ASSESSMENT — PAIN DESCRIPTION - DESCRIPTORS
DESCRIPTORS: SORE
DESCRIPTORS: CRAMPING

## 2024-09-30 NOTE — PROGRESS NOTES
[id number] [id number]   Office [phone], Fax [phone]    Post-Partum Day Number 1 Progress Note    Patient making appropriate progress post-partum with the exception of uterine pain that has been only partially controlled with tylenol/ibuprofen regimen. Lochia normal.  Tolerating regular diet.  Ambulating.  Voiding without difficulty.  + flatus.  no BM.      Vitals:    Vitals:    24 0649   BP: (!) 106/58   Pulse: 76   Resp: 16   Temp: 97.3 °F (36.3 °C)   SpO2: 98%      Temp (24hrs), Av.8 °F (36.6 °C), Min:97.3 °F (36.3 °C), Max:98.2 °F (36.8 °C)      Exam:  Patient without distress.               CTAB, no w/r/r/c.               RRR, +S1 and S2, no m/r/g.    Abdomen soft, fundus firm at level of umbilicus, significantly tender to palpation on initial exam this morning,  but less significant on re-exam.               Lower extremities:  No edema. No palpable cords or tenderness.    Lab/Data Review:  No results found for this or any previous visit (from the past 12 hour(s)).    Assessment and Plan:    Jaylene Tobin is a 28 y.o.  s/p  at 38w3d. Pregnancy was complicated by maternal obesity.     Postpartum endometritis: Febrile to 102 / 0600, s/p initial 1x dose of ampicillin 2g and gentamicin 5 mg/kg. This morning there is new uterine tenderness and pt has had Tmax 99.9 however concern that tylenol may be masking fever.   Ampicillin 2 g q6h for 4 doses and gentamicin 5 mg/kg x1 to cover for additional 24h  Repeat CBC  Continue to monitor for fever, tachycardia, pain, and other signs of infection progression.  Pain control with tylenol/ibuprofen, heat and ice packs as needed    Depression: Chronic depression treated with Lexapro 10mg qd. EPDS 17, no SI/SH.  - Continue Lexapro  - Close postpartum follow up in 1 week post-discharge    Patient appears to be having otherwise uncomplicated post-partum course.      Continue routine perineal care and

## 2024-09-30 NOTE — LACTATION NOTE
This note was copied from a baby's chart.  CA introduced self, mother states that she has not started to try to breast feed yet but would like to. Infant just finished formula feeding and the FOB stated that maybe we could try breastfeeding at another time. CA asked mother if she would like a hand pump and she said yes, however, she was uninterested in learning how to use it at this time. CA encouraged family to call when they are ready for lactation assistance and education. A Yoruba breast feeding booklet was left    Discussed with mother her plan for feeding.  Reviewed the benefits of exclusive breast milk feeding during the hospital stay.   Informed her of the risks of using formula to supplement in the first few days of life as well as the benefits of successful breast milk feeding; referred her to the Breastfeeding booklet about this information.   She acknowledges understanding of information reviewed and states that it is her plan to breast feed and formula feed her infant.  Will support her choice and offer additional information as needed.      Pt will successfully establish breastfeeding by feeding in response to early feeding cues   or wake every 3h, will obtain deep latch, and will keep log of feedings/output.  Taught to BF at hunger cues and or q 2-3 hrs and to offer 10-20 drops of hand expressed colostrum at any non-feeds.      Left Breast: Soft  Left Nipple: Protrude  Right Nipple: Protrude  Right Breast: Soft               Formula Type: Similac 360 Total Care

## 2024-09-30 NOTE — DISCHARGE INSTRUCTIONS
nada por la vaginal por 6 semanas. Luego del parto por cesárea/ vaginal debe evitar tener relaciones sexuales por 6 semanas.Tendrá de la torre nael de seguimiento posparto a las 6 semanas. Puede manejar de la torre vehículo mientras no esté tomando Percocet ni ningún medicamento nárcotico (Motrin está jose cruz).       Entiendo que si surge algún problema cuando llegue a mi hogar puedo contactar a mi médico.    Me ordonez explicado las siguientes instrucciones y ordonez aclarado mis dudas.    Entiendo y reconozco las instrucciones brindadas.                                                                                                                                               Firma de médico / Enfermera                                                                 Fecha/Hora                                                                                                                                              Firma de paciente ó representante                                                         Fecha/Hora

## 2024-09-30 NOTE — PROGRESS NOTES
1935 called FP MD as pt is reporting 9/10 lower abdominal pain and pain in her spine. Firm at U with scant lochia. Denies RUQ pain. MD to bedside to assess and placed orders for norco and simethicone.     2010 pt expresses desire to breastfeed. Pt has been bottle feeding up until this point. She declines breastfeeding at this time. Told pt to call me prior to next feed and I will assist with nursing. Reviewed postpartum book with pt and spouse.     2115 attempted breastfeeding. Pt would like to give infant formula for now.     2330 notified MD pt's pain is 8/10 again. No new orders at this time.

## 2024-10-01 ENCOUNTER — TELEPHONE (OUTPATIENT)
Age: 29
End: 2024-10-01

## 2024-10-01 VITALS
TEMPERATURE: 98.1 F | HEART RATE: 80 BPM | BODY MASS INDEX: 41.12 KG/M2 | SYSTOLIC BLOOD PRESSURE: 115 MMHG | RESPIRATION RATE: 16 BRPM | WEIGHT: 204 LBS | OXYGEN SATURATION: 98 % | HEIGHT: 59 IN | DIASTOLIC BLOOD PRESSURE: 72 MMHG

## 2024-10-01 PROCEDURE — 6370000000 HC RX 637 (ALT 250 FOR IP)

## 2024-10-01 RX ORDER — FERROUS SULFATE 325(65) MG
325 TABLET ORAL EVERY OTHER DAY
Qty: 30 TABLET | Refills: 0 | Status: SHIPPED | OUTPATIENT
Start: 2024-10-01

## 2024-10-01 RX ORDER — IBUPROFEN 800 MG/1
800 TABLET, FILM COATED ORAL 2 TIMES DAILY PRN
Qty: 30 TABLET | Refills: 0 | Status: SHIPPED | OUTPATIENT
Start: 2024-10-01

## 2024-10-01 RX ORDER — PSEUDOEPHEDRINE HCL 30 MG
100 TABLET ORAL DAILY
Qty: 20 CAPSULE | Refills: 0 | Status: SHIPPED | OUTPATIENT
Start: 2024-10-01

## 2024-10-01 RX ADMIN — DOCUSATE SODIUM 100 MG: 100 CAPSULE, LIQUID FILLED ORAL at 08:45

## 2024-10-01 RX ADMIN — IBUPROFEN 800 MG: 800 TABLET ORAL at 06:05

## 2024-10-01 ASSESSMENT — PAIN SCALES - GENERAL: PAINLEVEL_OUTOF10: 4

## 2024-10-01 ASSESSMENT — PAIN DESCRIPTION - ORIENTATION: ORIENTATION: LOWER

## 2024-10-01 ASSESSMENT — PAIN DESCRIPTION - LOCATION: LOCATION: ABDOMEN

## 2024-10-01 ASSESSMENT — PAIN DESCRIPTION - DESCRIPTORS: DESCRIPTORS: SORE

## 2024-10-01 NOTE — LACTATION NOTE
This note was copied from a baby's chart.  Mother and baby for discharge.  # [id number] Mother has been primarily formula feeding her baby. She has not been pumping ([employer] hand pump at bedside but she has not been using it.) LC gave mother instructions on how to use the pump and discussed how to store and prepare expressed breast milk for baby.   Resident doctor got LC to assist with breastfeeding. Baby was fussing on breast. Baby did latch onto left breast in cradle hold with LC assistance with latch. Baby would come off breast and was fussy. Nipple shield applied. Baby did suckle on and off left breast and managed to breastfeed for approx. 5 minutes. Breastfeeding followed up with formula which was taken well in a bottle. LC discussed the following:    Reviewed breastfeeding basics:  Supply and demand,  stomach size, early  Feeding cues, skin to skin, positioning and baby led latch-on, assymetrical latch with signs of good, deep latch vs shallow, feeding frequency and duration, and log sheet for tracking infant feedings and output.  Breastfeeding Booklet and Warm line information given.  Discussed typical  weight loss and the importance of infant weight checks with pediatrician 1-2 post discharge.       Discussed eating a healthy diet. Instructed mother to eat a variety of foods in order to get a well balanced diet. She should consume an extra 500 calories per day (more than her non-pregnant requirement.) These extra calories will help provide energy needed for optimal breast milk production. Mother also encouraged to \"drink to thirst\" and it is recommended that she drink fluids such as water, fruit/vegetable juice. Nutritious snacks should be available so that she can eat throughout the day to help satisfy her hunger and maintain a good milk supply.       Discussed what to do if she gets engorged or if her nipples become sore:    Engorgement Care Guidelines:  Reviewed how milk is made and

## 2024-10-01 NOTE — TELEPHONE ENCOUNTER
I called the patient to let her know we received her Nexplanon implant in clinic today. Patient said she will call us back to schedule an appointment for this procedure because she wanted to talk to a provider first.

## 2024-10-01 NOTE — DISCHARGE SUMMARY
Obstetrical Discharge Summary     Name: Jaylene Tobin MRN: [id number]  SSN: [id number]    YOB: 1995  Age: 28 y.o.  Sex: female      Admit Date: 2024    Discharge Date: 10/1/2024     Admitting Physician: Rosemarie Montana DO     Attending Physician:  Dianne Tierney DO  Resident Physician: Mariana Daniels MD    Admission Diagnoses: Normal labor [O80, Z37.9]    Discharge Diagnoses:   Information for the patient's :  Nii Tobin, Female Jaylene [[id number]]   @[id number]@     Additional Diagnoses:   - Postpartum endometritis    Immunization(s):   Immunization History   Administered Date(s) Administered    Hep B, ENGERIX-B, (age 20y+), IM, 1mL 2024, 2024    Influenza, FLUCELVAX, (age 6 mo+), MDCK, Quadv PF, 0.5mL 2024    TDaP, ADACEL (age 10y-64y), BOOSTRIX (age 10y+), IM, 0.5mL 2024        Hospital Course:   Patient is a 28 y.o.  s/p  at 38 weeks 3 days.  Presented for  vaginal bleeding and contractions found to be in labor. Pregnancy complicated by maternal obesity. Labor was uncomplicated.  Delivered liveborn female by  without complications. Did received TXA and miso for brisk bleeding however QBL 352cc. Also experienced fever to 102F, 2 hours after delivery and received ampicillin and gentamicin given concern for postpartum endometritis with new uterine tenderness PPD1, antibiotics were completed overnight between PPD1-2 with resolution of symptoms.  Otherwise normal hospital course following the delivery.  EPDS score 17, patient has baseline MDD treated with lexapro 10 mg daily, denies harm to self or baby, closer follow up arranged with [facility] and social work. Was also assisted by lactation specialist during admission for help with breastfeeding, patient has desire to breast and bottle feed but has had difficulties, waiting on pump to arrive at home.     On day of discharge patient reported minimal lochia, reasonably controlled pain, and no

## 2024-10-01 NOTE — CARE COORDINATION
10/1/2024  1:02 PM    CM met with KATHRYN to complete initial assessment and begin discharge planning.  MOB verified and confirmed demographics.  KATHRYN lives with FOB, at the address on file.  KATHRYN reports she has good family support, and feels like she has the support she needs when she returns home.  KATHRYN plans to breast and bottle feed baby and pump was ordered by SW at OB clinic.  [facility] will provide follow up care for infant. KATHRYN has car seat, bassinet/crib, clothing, bottles and all necessary supplies for baby. KATHRYN has [employer], and will be adding baby to this policy. CM discussed process to add baby to insurance, KATHRYN verbalized understanding.       10/01/24 1302   Service Assessment   Patient Orientation Alert and Oriented   Cognition Alert   History Provided By Patient   Primary Caregiver Self   Support Systems Spouse/Significant Other   PCP Verified by CM Yes   Last Visit to PCP Within last 3 months   Prior Functional Level Independent in ADLs/IADLs   Current Functional Level Independent in ADLs/IADLs   Can patient return to prior living arrangement Yes   Ability to make needs known: Good   Family able to assist with home care needs: Yes   Would you like for me to discuss the discharge plan with any other family members/significant others, and if so, who? No   Financial Resources Medicaid;[facility]     Rashid Owens CM

## 2024-10-01 NOTE — ANESTHESIA POSTPROCEDURE EVALUATION
Department of Anesthesiology  Postprocedure Note    Patient: Jaylene Tobin  MRN: [id number]  YOB: 1995  Date of evaluation: 9/30/2024    Procedure Summary       Date: 09/28/24 Room / Location:     Anesthesia Start: 1648 Anesthesia Stop: 09/29/24 0416    Procedure: Labor Analgesia Diagnosis:     Scheduled Providers:  Responsible Provider: Paddy Gamez DO    Anesthesia Type: spinal, epidural ASA Status: 2            Anesthesia Type: No value filed.    Natalie Phase I:      Natalie Phase II:      Anesthesia Post Evaluation    Patient location during evaluation: bedside  Level of consciousness: awake  Pain score: 0  Airway patency: patent  Nausea & Vomiting: no nausea  Cardiovascular status: hemodynamically stable  Respiratory status: acceptable  Hydration status: euvolemic  Pain management: adequate    No notable events documented.

## 2024-10-07 ENCOUNTER — OFFICE VISIT (OUTPATIENT)
Age: 29
End: 2024-10-07

## 2024-10-07 VITALS
HEART RATE: 85 BPM | WEIGHT: 189.6 LBS | TEMPERATURE: 98.4 F | RESPIRATION RATE: 16 BRPM | BODY MASS INDEX: 38.22 KG/M2 | SYSTOLIC BLOOD PRESSURE: 105 MMHG | DIASTOLIC BLOOD PRESSURE: 69 MMHG | OXYGEN SATURATION: 97 % | HEIGHT: 59 IN

## 2024-10-07 DIAGNOSIS — F41.9 ANXIETY AND DEPRESSION: Primary | ICD-10-CM

## 2024-10-07 DIAGNOSIS — Z13.31 POSITIVE DEPRESSION SCREENING: Primary | ICD-10-CM

## 2024-10-07 DIAGNOSIS — F32.A ANXIETY AND DEPRESSION: Primary | ICD-10-CM

## 2024-10-07 PROCEDURE — 0503F POSTPARTUM CARE VISIT: CPT

## 2024-10-07 RX ORDER — FLUOXETINE 10 MG/1
10 CAPSULE ORAL DAILY
Qty: 90 CAPSULE | Refills: 0 | Status: SHIPPED | OUTPATIENT
Start: 2024-10-07

## 2024-10-07 ASSESSMENT — PATIENT HEALTH QUESTIONNAIRE - PHQ9
SUM OF ALL RESPONSES TO PHQ QUESTIONS 1-9: 13
3. TROUBLE FALLING OR STAYING ASLEEP: SEVERAL DAYS
10. IF YOU CHECKED OFF ANY PROBLEMS, HOW DIFFICULT HAVE THESE PROBLEMS MADE IT FOR YOU TO DO YOUR WORK, TAKE CARE OF THINGS AT HOME, OR GET ALONG WITH OTHER PEOPLE: VERY DIFFICULT
7. TROUBLE CONCENTRATING ON THINGS, SUCH AS READING THE NEWSPAPER OR WATCHING TELEVISION: SEVERAL DAYS
6. FEELING BAD ABOUT YOURSELF - OR THAT YOU ARE A FAILURE OR HAVE LET YOURSELF OR YOUR FAMILY DOWN: SEVERAL DAYS
SUM OF ALL RESPONSES TO PHQ QUESTIONS 1-9: 13
SUM OF ALL RESPONSES TO PHQ9 QUESTIONS 1 & 2: 5
1. LITTLE INTEREST OR PLEASURE IN DOING THINGS: MORE THAN HALF THE DAYS
SUM OF ALL RESPONSES TO PHQ QUESTIONS 1-9: 13
SUM OF ALL RESPONSES TO PHQ QUESTIONS 1-9: 13
9. THOUGHTS THAT YOU WOULD BE BETTER OFF DEAD, OR OF HURTING YOURSELF: NOT AT ALL
5. POOR APPETITE OR OVEREATING: SEVERAL DAYS
4. FEELING TIRED OR HAVING LITTLE ENERGY: NEARLY EVERY DAY
2. FEELING DOWN, DEPRESSED OR HOPELESS: NEARLY EVERY DAY
8. MOVING OR SPEAKING SO SLOWLY THAT OTHER PEOPLE COULD HAVE NOTICED. OR THE OPPOSITE, BEING SO FIGETY OR RESTLESS THAT YOU HAVE BEEN MOVING AROUND A LOT MORE THAN USUAL: SEVERAL DAYS

## 2024-10-07 ASSESSMENT — ANXIETY QUESTIONNAIRES
7. FEELING AFRAID AS IF SOMETHING AWFUL MIGHT HAPPEN: NEARLY EVERY DAY
1. FEELING NERVOUS, ANXIOUS, OR ON EDGE: NEARLY EVERY DAY
3. WORRYING TOO MUCH ABOUT DIFFERENT THINGS: NEARLY EVERY DAY
2. NOT BEING ABLE TO STOP OR CONTROL WORRYING: NEARLY EVERY DAY
5. BEING SO RESTLESS THAT IT IS HARD TO SIT STILL: MORE THAN HALF THE DAYS
4. TROUBLE RELAXING: NEARLY EVERY DAY
6. BECOMING EASILY ANNOYED OR IRRITABLE: NEARLY EVERY DAY
IF YOU CHECKED OFF ANY PROBLEMS ON THIS QUESTIONNAIRE, HOW DIFFICULT HAVE THESE PROBLEMS MADE IT FOR YOU TO DO YOUR WORK, TAKE CARE OF THINGS AT HOME, OR GET ALONG WITH OTHER PEOPLE: SOMEWHAT DIFFICULT

## 2024-10-07 NOTE — PROGRESS NOTES
Session Code 78690 / : Mallorie #[id number]       Jaylene Tobin is a 28 y.o. female    Chief Complaint   Patient presents with    Postpartum Care     Patinet has no consers today       \"Have you been to the ER, urgent care clinic since your last visit?  Hospitalized since your last visit?\"    NO    “Have you seen or consulted any other health care providers outside of [facility] since your last visit?”    NO              There were no vitals filed for this visit.       No data to display              Health Maintenance Due   Topic Date Due    Varicella vaccine (1 of 2 - 13+ 2-dose series) Never done    Flu vaccine (1) 08/01/2024    COVID-19 Vaccine (1 - 2023-24 season) Never done

## 2024-10-07 NOTE — LACTATION NOTE
Lactation consult:  Mom states baby won't nurse.  Mom states gave mostly formula in the hospital because she only had drops of milk.    Discussed how milk is made and importance of colostrum.  Discussed supply and demand.  Mom did not have a pump till yesterday.  Has not pumped yet.    Mom would like to breast feed.  Positioned baby to left breat.  Shown how to use a latch assist to draw out nipple.  Discussed hd and expressed breast milk into shield. Baby suckled but is fussy. and stimulation also.  No latch achieved.  Shown how to use nipple shield.   Baby latched but is fussy.  Repositioned and nursed intermittently for 10 min.       Discussed that mom may need to nurse and pump to increase breast milk since mom has not nursed or pumped since birth.  Discussed nursing then pumping for 15 min.  Baby should have 4-5 wet diapers in 24 hours,  Mom may need to supplement with formula if baby is not having sufficient voids.  Guidelines for pumping, milk collection and storage, proper cleaning of pump parts all reviewed.    Given breast feeding booklet and warm line number along with my number.    Information discussed in Amharic

## 2024-10-07 NOTE — PROGRESS NOTES
[id number]  [address] [place] [address]   Office [phone], Fax [phone]    Subjective:   History provided by patient     Per DC Summary:  \"28 y.o.  s/p  at 38 weeks 3 days.  Presented for  vaginal bleeding and contractions found to be in labor. Pregnancy complicated by maternal obesity. Labor was uncomplicated.  Delivered liveborn female by  without complications. Did received TXA and miso for brisk bleeding however QBL 352cc. Also experienced fever to 102F, 2 hours after delivery and received ampicillin and gentamicin given concern for postpartum endometritis with new uterine tenderness PPD1, antibiotics were completed overnight between PPD1-2 with resolution of symptoms.  Otherwise normal hospital course following the delivery.  EPDS score 17, patient has baseline MDD treated with lexapro 10 mg daily.    Lochia: normal  Pain: controlled  Baby: doing well, will be seeing PCP  Sexual activity: No  Plan for contraception: Nexplanon  Breast/bottle: Both  Support from FOB/family: Yes, FOB present  Symptoms of depression: No SI, endorses feeling depressed/slightly worse since having her child  EDPS score: 20    SocHx.   - Denies smoking, alcohol use, illicit drug use  - Sexually active: No  - Occupation: None    Review of Systems    Objective:     Vitals:    10/07/24 1033   BP: 105/69   Pulse: 85   Resp: 16   Temp: 98.4 °F (36.9 °C)   SpO2: 97%      Exam:  Patient without distress.    Abdomen soft, fundus firm at level of umbilicus, nontender.               Lower extremities:  No edema. No palpable cords or tenderness.   Chaperoned by Sheri: Small R labial laceration present with small amount of blood present removed with gauze. Good approximation. No oozing or active blood flow from this area.     Assessment and orders:   Assessment and Plan:    Jaylene Tobin is a 28 y.o.  s/p  at 38 weeks 3 days.     - Depression: Present during pregnancy. Pt denies SI. Wants to stop

## 2024-10-14 ENCOUNTER — CLINICAL DOCUMENTATION (OUTPATIENT)
Age: 29
End: 2024-10-14

## 2024-10-18 ENCOUNTER — HOSPITAL ENCOUNTER (EMERGENCY)
Facility: HOSPITAL | Age: 29
Discharge: HOME | End: 2024-10-18
Attending: EMERGENCY MEDICINE
Payer: COMMERCIAL

## 2024-10-18 VITALS
BODY MASS INDEX: 36.29 KG/M2 | WEIGHT: 180 LBS | TEMPERATURE: 98.2 F | HEIGHT: 59 IN | SYSTOLIC BLOOD PRESSURE: 111 MMHG | RESPIRATION RATE: 18 BRPM | DIASTOLIC BLOOD PRESSURE: 65 MMHG | OXYGEN SATURATION: 98 % | HEART RATE: 82 BPM

## 2024-10-18 PROCEDURE — 99282 EMERGENCY DEPT VISIT SF MDM: CPT

## 2024-10-18 PROCEDURE — 90791 PSYCH DIAGNOSTIC EVALUATION: CPT

## 2024-10-18 ASSESSMENT — ENCOUNTER SYMPTOMS
ABDOMINAL PAIN: 0
COLOR CHANGE: 0
NAUSEA: 0
VOMITING: 0
CONSTIPATION: 0
SHORTNESS OF BREATH: 0
DIARRHEA: 0
BACK PAIN: 0

## 2024-10-18 ASSESSMENT — PAIN SCALES - GENERAL: PAINLEVEL_OUTOF10: 8

## 2024-10-18 ASSESSMENT — PAIN DESCRIPTION - LOCATION: LOCATION: HEAD

## 2024-10-18 NOTE — ED PROVIDER NOTES
[facility] EMERGENCY DEPT  EMERGENCY DEPARTMENT ENCOUNTER      Pt Name: Jaylene Tobin  MRN: [id number]  Birthdate 1995  Date of evaluation: 10/18/2024  Provider: Waldemar Whipple MD    CHIEF COMPLAINT       Chief Complaint   Patient presents with    Mental Health Problem         HISTORY OF PRESENT ILLNESS   (Location/Symptom, Timing/Onset, Context/Setting, Quality, Duration, Modifying Factors, Severity)  Note limiting factors.   Jaylene Tobin is a 28 y.o. female who presents to the emergency department      The history is provided by the patient and the spouse. The history is limited by a language barrier. A  was used.   Mental Health Problem  Presenting symptoms: depression    Presenting symptoms: no hallucinations, no self-mutilation and no suicidal thoughts    Patient accompanied by:  Family member  Onset quality:  Gradual  Timing:  Constant  Progression:  Worsening  Chronicity:  Recurrent  Context: stressful life event    Treatment compliance:  All of the time  Relieved by:  Nothing  Worsened by:  Family interactions and lack of sleep  Associated symptoms: no abdominal pain, no chest pain and no headaches        Nursing Notes were reviewed.    REVIEW OF SYSTEMS    (2-9 systems for level 4, 10 or more for level 5)     Review of Systems   Constitutional:  Negative for activity change, chills and fever.   HENT:  Negative for nosebleeds.    Eyes:  Negative for visual disturbance.   Respiratory:  Negative for shortness of breath.    Cardiovascular:  Negative for chest pain and palpitations.   Gastrointestinal:  Negative for abdominal pain, constipation, diarrhea, nausea and vomiting.   Genitourinary:  Negative for difficulty urinating, dysuria, hematuria and urgency.   Musculoskeletal:  Negative for back pain, neck pain and neck stiffness.   Skin:  Negative for color change.   Allergic/Immunologic: Negative for immunocompromised state.   Neurological:  Negative for dizziness, seizures,  Behavior: Behavior normal.         Thought Content: Thought content normal.         Judgment: Judgment normal.         DIAGNOSTIC RESULTS     EKG: All EKG's are interpreted by the Emergency Department Physician who either signs or Co-signs this chart in the absence of a cardiologist.        RADIOLOGY:   Non-plain film images such as CT, Ultrasound and MRI are read by the radiologist. Plain radiographic images are visualized and preliminarily interpreted by the emergency physician with the below findings:        Interpretation per the Radiologist below, if available at the time of this note:    No orders to display         ED BEDSIDE ULTRASOUND:   Performed by ED Physician - none    LABS:  Labs Reviewed - No data to display    All other labs were within normal range or not returned as of this dictation.    EMERGENCY DEPARTMENT COURSE and DIFFERENTIAL DIAGNOSIS/MDM:   Vitals:    Vitals:    10/18/24 0954   BP: 111/65   Pulse: 82   Resp: 18   Temp: 98.2 °F (36.8 °C)   TempSrc: Oral   SpO2: 98%   Weight: 81.6 kg (180 lb)   Height: 1.499 m (4' 11\")           Medical Decision Making    This is a 28-year-old female with past medical history, review of systems, physical exam as above, presenting with complaints of postpartum depression.  Patient states previous history of depression, counseling, denies hospitalization for mental health disorder.  She states she has had increasing mood disorder since giving birth at the end of last month.  She states she was started on antidepressants by her primary care physician, is unclear what medication is, however chart review indicates it is Lexapro.  She denies auditory visual hallucinations, states compliant with counseling sessions, however has not been seen in some time.  She denies specific thoughts of self-harm, or harm directed towards others.  Upon arrival she is noted to be normotensive, afebrile without tachycardia, satting well on room air.  She is awake and alert in no

## 2024-10-18 NOTE — BSMART NOTE
Comprehensive Assessment Form Part 1    Section I - Disposition    Primary Diagnosis: Unspecified Mood Disorder    The Medical Doctor to Psychiatrist conference was not completed.  The Medical Doctor is in agreement with Psychiatrist disposition because of (reason) patient is not seeking admission and does not meet TDO criteria.  The plan is discharge with outpatient therapy referrals.  The on-call Psychiatrist consulted was Dr. COWAN.  The admitting Psychiatrist will be Dr. COWAN.  The admitting Diagnosis is NA.  The Payor source is [facility].  This writer reviewed the [place] Suicide Severity Rating Scale in nursing flowsheet and the risk level assigned is low.  Based on this assessment, the risk of suicide is no risk and the plan is discharge with outpatient therapy referrals.    Section II - Integrated Summary  Summary:  ***    The patient has demonstrated mental capacity to provide informed consent.  The information is given by the patient and her  .  The Chief Complaint is mental health problem.  The Precipitant Factors are post partum.  Previous Hospitalizations: no  The patient has not previously been in restraints.  Current Psychiatrist and/or  is [facility].    Lethality Assessment:    The potential for suicide is noted by the following: recent brief ideation.  The potential for homicide is not noted.  The patient has not been a perpetrator of sexual or physical abuse.  There are not pending charges.  The patient is not felt to be at risk for self harm or harm to others.  The attending nurse was advised that security has not been notified.    Section III - Psychosocial  The patient's overall mood and attitude is withdrawn.  Feelings of helplessness and hopelessness are {OBSERVED/NOT OBSERVED:95067}.  Generalized anxiety is {OBSERVED/NOT OBSERVED:30552}.  Panic is {OBSERVED/NOT OBSERVED:15962}. Phobias are {OBSERVED/NOT OBSERVED:94567}.  Obsessive compulsive tendencies are

## 2024-10-18 NOTE — BSMART NOTE
BSMART assessment completed, and suicide risk level noted to be no risk. Primary Nurse Martha and Physician Dr. Whipple notified. Concerns not observed.

## 2024-10-19 NOTE — PROGRESS NOTES
[address] [id number]   Office [phone], Fax [phone]    Postpartum Note    Subjective:   History provided by patient.  28 y.o. female status post  at 38w3d presenting for postpartum visit.  Patient doing well post-partum without significant complaint.      Lochia: normal  Pain: controlled  Baby: doing well  Sexual activity: not yet  Plan for contraception: Nexplanon with Dr. Jaramillo on 10/25 today.   Breast/bottle: both  Support from FOB/family: yes  Symptoms of depression: EDPS  EDPS score: 15    #Depression  - Previously on Lexapro, then switching to Prozac on 10/7.   - No ASE.   - Feels like mood has improved somewhat on medication  - Seen in ED on 10/18 for sx of PP depression, nurse triage endorsed fleeting thoughts of suicide but no plan, evaluated by BSMART. Discharged home.  - Has not been seen by therapist or counselor.      Objective:     Vitals:  LMP 2024 (Approximate)     Exam:  Patient without distress.    Abdomen soft, fundus firm at level of umbilicus, nontender.               Lower extremities:  No edema. No palpable cords or tenderness.      Assessment and Plan:    Jaylene Tobin is a 28 y.o.  s/p  at 38 weeks 3 days.  Patient having uncomplicated post-partum course. Currently 4 weeks PP.     Continue routine care  Flu vaccine today.   Depression: Present during pregnancy. Switched from Lexapro to prozac on 10/7 with Dr. Billy. Doing well on medication. EDPS improved. Will increase to normal starting dose at 20mg. Has yet to see therapist, but has resources and hopefully will see this week per patient.  Spoke with Lyla today.   Nexplanon placement scheduled for 10/25 with Dr. Jaramillo. Patient aware.   Labial laceration, repaired with sutures. Reports some pruritus, recommend Vaseline to help with irritation.     Recommend FU in 4 weeks for depression.               Pt was discussed with Dr Thomas (attending physician).  Reina Ding,

## 2024-10-21 ENCOUNTER — OFFICE VISIT (OUTPATIENT)
Age: 29
End: 2024-10-21
Payer: COMMERCIAL

## 2024-10-21 VITALS
HEIGHT: 59 IN | BODY MASS INDEX: 38.71 KG/M2 | WEIGHT: 192 LBS | HEART RATE: 64 BPM | TEMPERATURE: 97.5 F | RESPIRATION RATE: 14 BRPM | OXYGEN SATURATION: 96 % | DIASTOLIC BLOOD PRESSURE: 75 MMHG | SYSTOLIC BLOOD PRESSURE: 113 MMHG

## 2024-10-21 DIAGNOSIS — Z78.9 NEED FOR FOLLOW-UP BY SOCIAL WORKER: Primary | ICD-10-CM

## 2024-10-21 DIAGNOSIS — Z23 ENCOUNTER FOR IMMUNIZATION: ICD-10-CM

## 2024-10-21 DIAGNOSIS — F32.A ANXIETY AND DEPRESSION: Primary | ICD-10-CM

## 2024-10-21 DIAGNOSIS — F41.9 ANXIETY AND DEPRESSION: Primary | ICD-10-CM

## 2024-10-21 PROCEDURE — 90661 CCIIV3 VAC ABX FR 0.5 ML IM: CPT

## 2024-10-21 PROCEDURE — 0503F POSTPARTUM CARE VISIT: CPT

## 2024-10-21 PROCEDURE — 99213 OFFICE O/P EST LOW 20 MIN: CPT

## 2024-10-21 PROCEDURE — PBSHW INFLUENZA, FLUCELVAX TRIVALENT, (AGE 6 MO+) IM, PRESERVATIVE FREE, 0.5ML

## 2024-10-21 RX ORDER — FLUOXETINE 10 MG/1
20 CAPSULE ORAL DAILY
Qty: 90 CAPSULE | Refills: 1 | Status: SHIPPED | OUTPATIENT
Start: 2024-10-21 | End: 2024-10-21

## 2024-10-21 RX ORDER — FLUOXETINE 10 MG/1
20 CAPSULE ORAL DAILY
Qty: 60 CAPSULE | Refills: 2 | Status: SHIPPED | OUTPATIENT
Start: 2024-10-21

## 2024-10-21 NOTE — PROGRESS NOTES
Session Code 19824 / : Mallory#[id number]       Jaylene Tobin is a 28 y.o. female    Chief Complaint   Patient presents with    Postpartum Care     Patient has a concern today, irritation and itching where the stiches are. It has been on going for 2 weeks.       \"Have you been to the ER, urgent care clinic since your last visit?  Hospitalized since your last visit?\"    [place], for depression.    “Have you seen or consulted any other [facility] care providers [facility]?”    NO              There were no vitals filed for this visit.       No data to display              Health Maintenance Due   Topic Date Due    Varicella vaccine (1 of 2 - 13+ 2-dose series) Never done    Flu vaccine (1) 08/01/2024    COVID-19 Vaccine (1 - 2023-24 season) Never done

## 2024-10-21 NOTE — PROGRESS NOTES
Resource visit with SHAHEEN Navigator.    Patient in need of assistance with obtaining birth certificate for , Georgina Bales. SW assisted patient with completing birth certificate form. Advised to go in person to Office of Vital Records, contact info and address provided during visit. Patient understands that she will need to take a money order in amount of $12/per certificate and identification.     Patient inquired on assistance with medical coverage once her Medicaid full-coverage terminates (full-coverage expected to end on ). Patient advised that her coverage will likely be reduced to limited coverage. Explained to patient limited coverage benefits. Patient advised to apply for GVISP 1 Assistance program. Application provided today during visit.    Recent ED visit on 10/18 r/t PP depression. Patient reports that mood has improved with medication. SHAHEEN inquired on counseling visits. Patient states has not reconnected with counselor since pregnant. SHAHEEN encouraged patient to contact counselor for follow-up and take advantage of insurance coverage available through November.    Plan:  Ongoing psychosocial support and resource referral, as desired by the patient and family.      VIVIAN Alfaro   Navigator

## 2024-10-25 ENCOUNTER — PROCEDURE VISIT (OUTPATIENT)
Age: 29
End: 2024-10-25
Payer: COMMERCIAL

## 2024-10-25 DIAGNOSIS — N94.6 DYSMENORRHEA: Primary | ICD-10-CM

## 2024-10-25 LAB
HCG, PREGNANCY, URINE, POC: NEGATIVE
VALID INTERNAL CONTROL, POC: NORMAL

## 2024-10-25 PROCEDURE — 11981 INSERTION DRUG DLVR IMPLANT: CPT | Performed by: FAMILY MEDICINE

## 2024-10-25 PROCEDURE — 81025 URINE PREGNANCY TEST: CPT | Performed by: FAMILY MEDICINE

## 2024-10-25 NOTE — PROGRESS NOTES
OFFICE PROCEDURE PROGRESS NOTE        Chart reviewed for the following:   Zahida ALMAGUER DO, have reviewed the History, Physical and updated the Allergic reactions for Jaylene Tobin     TIME OUT performed immediately prior to start of procedure:   Zahida ALMAGUER DO, have performed the following reviews on Jaylene Tobin prior to the start of the procedure:            * Patient was identified by name and date of birth   * Agreement on procedure being performed was verified  * Risks and Benefits explained to the patient  * Procedure site verified and marked as necessary  * Patient was positioned for comfort  * Consent was signed and verified     Time: 11:16 AM    Date of procedure: 10/25/2024    Procedure performed by:  Zahida Jaramillo DO and Yeni Guerrier MD     Provider assisted by: Stephanie Abdi LPN     Patient assisted by: self    How tolerated by patient: tolerated the procedure well with no complications    Post Procedural Pain Scale: 0 - No Hurt    Comments: none      The patient's left arm was selected and the proposed site marked with a sterile marking pen.  The site was cleaned with chloraprep x3.  The site was injected with 3mL 1% lidocaine from insertion to the full extent of the implant.  The insertion device was then used to elevated the skin and tunnel under the skin the full length of the implant.  The device was then deployed and withdrawn leaving the implant in place.  The implant was palpated to ensure proper placement.  The insertion device was inspected to insure that the entire device was deployed.     Guaze was placed over the incision and a pressure wrap was placed around the arm to reduce swelling overnight    The patient was given her information card and reminded that the device should be removed no later than three years and that contrceptive effectiveness was not guaranteed after that date.  The patient expressed understanding.

## 2024-10-25 NOTE — PROGRESS NOTES
Chief Complaint   Patient presents with    Procedure       Patient identified with name and .     There were no vitals filed for this visit.     1. Have you been to the ER, urgent care clinic since your last visit?  Hospitalized since your last visit?No    2. Have you seen or consulted any other health care providers outside of the [facility] since your last visit?  Include any pap smears or colon screening. No    Health Maintenance reviewed.

## 2024-11-26 ENCOUNTER — OFFICE VISIT (OUTPATIENT)
Age: 29
End: 2024-11-26

## 2024-11-26 DIAGNOSIS — Z78.9 NEEDS ASSISTANCE WITH COMMUNITY RESOURCES: Primary | ICD-10-CM

## 2024-11-27 NOTE — PROGRESS NOTES
SNAP enrollment visit with SHAHEEN Hutchinsonator via telephone. SHAHEEN assisted patient with food assistance application via [employer]. Application completed today, johanna #[id number]. Applied for patient and daughter. ID uploaded to application. SHAHEEN Hutchinsonator listed as authorized rep per patient's request/consent.      Patient advised she should hear back from [employer] within 30 days. Advised to respond to any request for additional documentation promptly and by due date to avoid denial of application.      Plan:  Ongoing psychosocial support and resource referral, as desired by the patient and family.       VIVIAN Alfaroator

## 2024-11-29 ENCOUNTER — OFFICE VISIT (OUTPATIENT)
Age: 29
End: 2024-11-29
Payer: COMMERCIAL

## 2024-11-29 VITALS
DIASTOLIC BLOOD PRESSURE: 72 MMHG | OXYGEN SATURATION: 97 % | HEIGHT: 59 IN | HEART RATE: 85 BPM | RESPIRATION RATE: 18 BRPM | TEMPERATURE: 98.4 F | WEIGHT: 193.8 LBS | SYSTOLIC BLOOD PRESSURE: 114 MMHG | BODY MASS INDEX: 39.07 KG/M2

## 2024-11-29 DIAGNOSIS — S31.41XD: ICD-10-CM

## 2024-11-29 DIAGNOSIS — F32.A ANXIETY AND DEPRESSION: Primary | ICD-10-CM

## 2024-11-29 DIAGNOSIS — H10.89 OTHER CONJUNCTIVITIS OF RIGHT EYE: ICD-10-CM

## 2024-11-29 DIAGNOSIS — F41.9 ANXIETY AND DEPRESSION: Primary | ICD-10-CM

## 2024-11-29 LAB
BILIRUBIN, URINE, POC: NEGATIVE
BLOOD URINE, POC: NORMAL
GLUCOSE URINE, POC: NEGATIVE
KETONES, URINE, POC: NEGATIVE
LEUKOCYTE ESTERASE, URINE, POC: NORMAL
NITRITE, URINE, POC: NEGATIVE
PH, URINE, POC: 7.5 (ref 4.6–8)
PROTEIN,URINE, POC: NORMAL
SPECIFIC GRAVITY, URINE, POC: 1.02 (ref 1–1.03)
URINALYSIS CLARITY, POC: CLEAR
URINALYSIS COLOR, POC: YELLOW
UROBILINOGEN, POC: NORMAL

## 2024-11-29 PROCEDURE — 81003 URINALYSIS AUTO W/O SCOPE: CPT

## 2024-11-29 PROCEDURE — 99213 OFFICE O/P EST LOW 20 MIN: CPT

## 2024-11-29 RX ORDER — MINERAL OIL/HYDROPHIL PETROLAT
OINTMENT (GRAM) TOPICAL
Qty: 99 G | Refills: 0 | Status: SHIPPED | OUTPATIENT
Start: 2024-11-29

## 2024-11-29 RX ORDER — FERROUS SULFATE 325(65) MG
325 TABLET ORAL
Qty: 180 TABLET | Refills: 1 | Status: SHIPPED | OUTPATIENT
Start: 2024-11-29

## 2024-11-29 RX ORDER — POLYMYXIN B SULFATE AND TRIMETHOPRIM 1; 10000 MG/ML; [USP'U]/ML
1 SOLUTION OPHTHALMIC EVERY 4 HOURS
Qty: 3 ML | Refills: 0 | Status: SHIPPED | OUTPATIENT
Start: 2024-11-29 | End: 2024-12-06

## 2024-11-29 RX ORDER — FLUOXETINE 40 MG/1
40 CAPSULE ORAL DAILY
Qty: 90 CAPSULE | Refills: 0 | Status: SHIPPED | OUTPATIENT
Start: 2024-11-29

## 2024-11-29 NOTE — PROGRESS NOTES
[address] [id number]   Office [phone], Fax [phone]    Postpartum Note    Subjective:   History provided by patient.  29 y.o. female status post  at 38w3d presenting for postpartum depression follow up    Lochia: normal  Pain: controlled  Baby: doing well  Sexual activity: not yet  Plan for contraception: Nexplanon with Dr. Jaramillo on 10/25  Breast/bottle: both  Support from FOB/family: yes  Symptoms of depression: EDPS  EDPS score: 12    10/21/24:  #Depression  - Previously on Lexapro, then switching to Prozac on 10/7.   - No ASE.   - Feels like mood has improved somewhat on medication  - Seen in ED on 10/18 for sx of PP depression, nurse triage endorsed fleeting thoughts of suicide but no plan, evaluated by BSMART. Discharged home.  - Has not been seen by therapist or counselor.    24:  - EPDS 12 today, down from 15 prior  - Doing well on her prozac at 20mg daily  - Still with pain in vagina and still with burning with urination  - Is having mild bleeding past week after getting nexplanon placed.    Objective:     Vitals:  /72 (Site: Right Upper Arm, Position: Sitting, Cuff Size: Medium Adult)   Pulse 85   Temp 98.4 °F (36.9 °C) (Temporal)   Resp 18   Ht 1.499 m (4' 11\")   Wt 87.9 kg (193 lb 12.8 oz)   SpO2 97%   BMI 39.14 kg/m²     Exam:  Patient without distress. Appears a bit anxious. R eye with erythema but EOMI and no discharge    Assessment and Plan:   Jaylene Tobin is a 29 y.o.  s/p  at 38 weeks 3 days. Now 2 months postpartum    1. Anxiety and depression  - Feeling a bit better, wants to increase dose. Increase to 30mg for 2-3 weeks, then to 40mg. No SI. Not currently with therapist but encouraged to schedule appt.  - FLUoxetine (PROZAC) 40 MG capsule; Take 1 capsule by mouth daily  Dispense: 90 capsule; Refill: 0    2. Encounter for postpartum visit  - Urine non-infectious  - AMB POC URINALYSIS DIP STICK AUTO W/O MICRO    3.

## 2024-11-29 NOTE — PROGRESS NOTES
[address] [place] [address]   Office [phone], Fax [phone]    Subjective:   History provided by patient     Per DC Summary:  \"28 y.o.  s/p  at 38 weeks 3 days.  Presented for  vaginal bleeding and contractions found to be in labor. Pregnancy complicated by maternal obesity. Labor was uncomplicated.  Delivered liveborn female by  without complications. Did received TXA and miso for brisk bleeding however QBL 352cc. Also experienced fever to 102F, 2 hours after delivery and received ampicillin and gentamicin given concern for postpartum endometritis with new uterine tenderness PPD1, antibiotics were completed overnight between PPD1-2 with resolution of symptoms.  Otherwise normal hospital course following the delivery.  EPDS score 17, patient has baseline MDD treated with lexapro 10 mg daily.    Lochia: normal  Pain: controlled  Baby: doing well, will be seeing PCP  Sexual activity: No  Plan for contraception: Nexplanon  Breast/bottle: Both  Support from FOB/family: Yes, FOB present  Symptoms of depression: No SI, endorses feeling depressed/slightly worse since having her child  EDPS score: 20    SocHx.   - Denies smoking, alcohol use, illicit drug use  - Sexually active: No  - Occupation: None    Review of Systems    Objective:     There were no vitals filed for this visit.     Exam:  Patient without distress.    Abdomen soft, fundus firm at level of umbilicus, nontender.               Lower extremities:  No edema. No palpable cords or tenderness.   Chaperoned by Sheri: Small R labial laceration present with small amount of blood present removed with gauze. Good approximation. No oozing or active blood flow from this area.     Assessment and orders:   Assessment and Plan:    Jaylene Tobin is a 29 y.o.  s/p  at 38 weeks 3 days.     - Depression: Present during pregnancy. Pt denies SI. Wants to stop lexapro as does not feel it has been helping at this point.

## 2024-11-29 NOTE — PROGRESS NOTES
Chief Complaint   Patient presents with    Postpartum Care     Still has some pain in vagina. Patient still feels the burning.      Vitals:    11/29/24 1509   BP: 114/72   Site: Right Upper Arm   Position: Sitting   Cuff Size: Medium Adult   Pulse: 85   Resp: 18   Temp: 98.4 °F (36.9 °C)   TempSrc: Temporal   SpO2: 97%   Weight: 87.9 kg (193 lb 12.8 oz)   Height: 1.499 m (4' 11\")     \"Have you been to the ER, urgent care clinic since your last visit?  Hospitalized since your last visit?\"    NO    “Have you seen or consulted any other health care providers outside of [facility] since your last visit?”    NO            Click Here for Release of Records Request

## 2024-12-09 ENCOUNTER — OFFICE VISIT (OUTPATIENT)
Age: 29
End: 2024-12-09

## 2024-12-09 DIAGNOSIS — Z78.9 NEED FOR FOLLOW-UP BY SOCIAL WORKER: Primary | ICD-10-CM

## 2024-12-09 NOTE — PROGRESS NOTES
SNAP enrollment follow-up visit with SHAHEEN Navigator.    Patient reports she received a notice regarding the SNAP application submitted on 11/27/24 advising of an interview by [place] [employer]  on 12/4/24. Per patient, she did not receive any call on 12/4/24 regarding this matter. Patient requesting assistance with next steps.     SHAHEEN assisted patient by contacting , Eboni Bailey at [phone]. No answer but a detailed message regarding case #[id number] was left requesting a rescheduled appointment/return call. Patient advised to allow a few days for  to get back to her or SHAHEEN Navigator. Patient agreed.    Plan:  Ongoing psychosocial support and resource referral, as desired by the patient and family.      [facility]   Navigator

## 2024-12-12 ENCOUNTER — OFFICE VISIT (OUTPATIENT)
Age: 29
End: 2024-12-12

## 2024-12-12 DIAGNOSIS — Z78.9 NEED FOR FOLLOW-UP BY SOCIAL WORKER: Primary | ICD-10-CM

## 2024-12-17 NOTE — PROGRESS NOTES
SNAP follow-up visit with SHAHEEN Navigator.    SW received call from [facility] , MsRajiv Lynn. Per , she will be processing SNAP case but is in need of income verification from patient.     Patient informed that this document is needed in order for her to receive SNAP benefits for  (case #[id number]). Patient agreed to obtain information and provide to SHAHEEN Navigator. Patient understands that there is limited time available to present this information; otherwise there is a chance that SNAP request could be denied. Patient also advised that she can take verification directly to the local  office.      Plan:  Ongoing psychosocial support and resource referral, as desired by the patient and family.      [facility]   Navigator

## 2025-01-28 ENCOUNTER — OFFICE VISIT (OUTPATIENT)
Age: 30
End: 2025-01-28

## 2025-01-28 DIAGNOSIS — Z78.9 NEED FOR FOLLOW-UP BY SOCIAL WORKER: Primary | ICD-10-CM

## 2025-01-28 NOTE — PROGRESS NOTES
Medicaid renewal enrollment visit with SHAHEEN Navigator via telephone. Paper application completed today and emailed to [facility] for processing. No changes reported per patient.      Patient advised she should hear back from [employer] within 30 days. Advised to respond to any request for additional documentation promptly and by due date to avoid denial of application.      Plan:  Ongoing psychosocial support and resource referral, as desired by the patient and family.       VIVIAN Alfaroator

## 2025-02-10 ENCOUNTER — OFFICE VISIT (OUTPATIENT)
Age: 30
End: 2025-02-10

## 2025-02-10 DIAGNOSIS — Z78.9 NEED FOR FOLLOW-UP BY SOCIAL WORKER: Primary | ICD-10-CM

## 2025-02-10 NOTE — PROGRESS NOTES
Follow up visit with SHAHEEN Navigator.    Patient reports she did not receive her monthly SNAP benefits and is in need of assistance regarding matter. SHAHEEN advised patient that as previously discussed the requested income verification was received late from her and therefore the reason why there has been an interruption in benefits. Patient advised that the verification was submitted to [employer] within the 30 days of earlene period; however, it is up to the  to process the documentation.    Case Info:  Case #: [id number]    SHAHEEN assisted patient by connecting with [facility] by phone,  ROBERT Bailey at [phone]. A message was left for the  as she did not answer. Patient to allow a few days for  to review information and return call. Should no response be received within the next 7-10 business days, patient advised to reapply for benefits.    Plan:  Ongoing psychosocial support and resource referral, as desired by the patient and family.      [facility]   Navigator

## 2025-02-13 ENCOUNTER — OFFICE VISIT (OUTPATIENT)
Age: 30
End: 2025-02-13

## 2025-02-13 DIAGNOSIS — Z59.41 MILD FOOD INSECURITY: Primary | ICD-10-CM

## 2025-02-13 SDOH — ECONOMIC STABILITY - FOOD INSECURITY: FOOD INSECURITY: Z59.41

## 2025-02-13 NOTE — PROGRESS NOTES
SNAP enrollment visit with SHAHEEN Hutchinsonator. SHAHEEN assisted patient with food assistance program enrollment process via [email or web address]. Application completed today, johanna #[id number]. ID and income information uploaded to application. SHAHEEN Hutchinsonator listed as authorized rep per patient's request/consent.      Patient advised she should hear back from [employer] within 30 days. Advised to respond to any request for additional documentation promptly and by due date to avoid denial of application.      Plan:  Ongoing psychosocial support and resource referral, as desired by the patient and family.       VIVIAN Alfaroator

## 2025-02-25 DIAGNOSIS — F32.A ANXIETY AND DEPRESSION: ICD-10-CM

## 2025-02-25 DIAGNOSIS — F41.9 ANXIETY AND DEPRESSION: ICD-10-CM

## 2025-02-25 NOTE — TELEPHONE ENCOUNTER
Medication Refill Request    Jaylene Tobin is requesting a refill of the following medication(s):   Requested Prescriptions     Pending Prescriptions Disp Refills    FLUoxetine (PROZAC) 40 MG capsule [Pharmacy Med Name: FLUOXETINE HCL 40 MG CAPSULE] 90 capsule 0     Sig: TAKE 1 CAPSULE BY MOUTH EVERY DAY        Listed PCP is Venkata Billy DO   [facility]  Last Date of Medication Prescribed: 10/21/2024  Date of Last Office Visit at [facility]: 02/13/2025  FUTURE APPOINTMENT: No future appointments.    Please send refill to:    [facility]/pharmacy #[id number] - [place], [place] - [address] - P [phone] - F [phone] [address] [facility] [address]  Phone: [phone] Fax: [phone]      Please review request and approve or deny with recommendations.

## 2025-02-28 RX ORDER — FLUOXETINE HYDROCHLORIDE 40 MG/1
CAPSULE ORAL DAILY
Qty: 90 CAPSULE | Refills: 0 | Status: SHIPPED | OUTPATIENT
Start: 2025-02-28

## 2025-03-03 ENCOUNTER — OFFICE VISIT (OUTPATIENT)
Age: 30
End: 2025-03-03

## 2025-03-03 DIAGNOSIS — Z78.9 NEED FOR FOLLOW-UP BY SOCIAL WORKER: Primary | ICD-10-CM

## 2025-03-05 NOTE — PROGRESS NOTES
SNAP enrollment follow-up visit with SHAHEEN Hutchinsonator.    Patient received notification from [facility] requesting verifications. Proof of income and residency verification needed. Patient provided such documentation. Information emailed to [facility] for processing.    Patient advised to allow 10 days for processing of information. Patient to contact  Navigator if she does not hear back from [facility] in 2 weeks.    Plan:  Ongoing psychosocial support and resource referral, as desired by the patient and family.      Lyla Haque [facility]   Navigator

## 2025-06-27 ENCOUNTER — OFFICE VISIT (OUTPATIENT)
Age: 30
End: 2025-06-27

## 2025-06-27 DIAGNOSIS — Z78.9 NEED FOR FOLLOW-UP BY SOCIAL WORKER: Primary | ICD-10-CM

## 2025-06-27 DIAGNOSIS — Z78.9 NEEDS ASSISTANCE WITH COMMUNITY RESOURCES: ICD-10-CM

## 2025-07-14 ENCOUNTER — OFFICE VISIT (OUTPATIENT)
Age: 30
End: 2025-07-14

## 2025-07-14 VITALS
TEMPERATURE: 98.4 F | DIASTOLIC BLOOD PRESSURE: 75 MMHG | SYSTOLIC BLOOD PRESSURE: 118 MMHG | OXYGEN SATURATION: 98 % | HEART RATE: 71 BPM | WEIGHT: 191.8 LBS | BODY MASS INDEX: 38.74 KG/M2

## 2025-07-14 DIAGNOSIS — Z30.46 NEXPLANON REMOVAL: ICD-10-CM

## 2025-07-14 DIAGNOSIS — N91.2 AMENORRHEA: Primary | ICD-10-CM

## 2025-07-14 DIAGNOSIS — N94.6 DYSMENORRHEA: ICD-10-CM

## 2025-07-14 PROCEDURE — 96372 THER/PROPH/DIAG INJ SC/IM: CPT

## 2025-07-14 PROCEDURE — 11976 REMOVE CONTRACEPTIVE CAPSULE: CPT

## 2025-07-14 PROCEDURE — 99213 OFFICE O/P EST LOW 20 MIN: CPT

## 2025-07-14 RX ORDER — LIDOCAINE HYDROCHLORIDE AND EPINEPHRINE 10; 10 MG/ML; UG/ML
5 INJECTION, SOLUTION INFILTRATION; PERINEURAL ONCE
Status: COMPLETED | OUTPATIENT
Start: 2025-07-14 | End: 2025-07-14

## 2025-07-14 RX ORDER — DROSPIRENONE AND ETHINYL ESTRADIOL 0.02-3(28)
1 KIT ORAL DAILY
Qty: 3 PACKET | Refills: 4 | Status: SHIPPED | OUTPATIENT
Start: 2025-07-14

## 2025-07-14 RX ADMIN — LIDOCAINE HYDROCHLORIDE AND EPINEPHRINE 5 ML: 10; 10 INJECTION, SOLUTION INFILTRATION; PERINEURAL at 15:20

## 2025-07-14 ASSESSMENT — PATIENT HEALTH QUESTIONNAIRE - PHQ9
8. MOVING OR SPEAKING SO SLOWLY THAT OTHER PEOPLE COULD HAVE NOTICED. OR THE OPPOSITE, BEING SO FIGETY OR RESTLESS THAT YOU HAVE BEEN MOVING AROUND A LOT MORE THAN USUAL: SEVERAL DAYS
SUM OF ALL RESPONSES TO PHQ QUESTIONS 1-9: 8
3. TROUBLE FALLING OR STAYING ASLEEP: SEVERAL DAYS
9. THOUGHTS THAT YOU WOULD BE BETTER OFF DEAD, OR OF HURTING YOURSELF: NOT AT ALL
SUM OF ALL RESPONSES TO PHQ QUESTIONS 1-9: 8
1. LITTLE INTEREST OR PLEASURE IN DOING THINGS: NOT AT ALL
2. FEELING DOWN, DEPRESSED OR HOPELESS: SEVERAL DAYS
10. IF YOU CHECKED OFF ANY PROBLEMS, HOW DIFFICULT HAVE THESE PROBLEMS MADE IT FOR YOU TO DO YOUR WORK, TAKE CARE OF THINGS AT HOME, OR GET ALONG WITH OTHER PEOPLE: SOMEWHAT DIFFICULT
4. FEELING TIRED OR HAVING LITTLE ENERGY: SEVERAL DAYS
SUM OF ALL RESPONSES TO PHQ QUESTIONS 1-9: 8
7. TROUBLE CONCENTRATING ON THINGS, SUCH AS READING THE NEWSPAPER OR WATCHING TELEVISION: SEVERAL DAYS
SUM OF ALL RESPONSES TO PHQ QUESTIONS 1-9: 8
5. POOR APPETITE OR OVEREATING: MORE THAN HALF THE DAYS
6. FEELING BAD ABOUT YOURSELF - OR THAT YOU ARE A FAILURE OR HAVE LET YOURSELF OR YOUR FAMILY DOWN: SEVERAL DAYS

## 2025-07-14 NOTE — CONSULTS
Session ID: [id number]  Session Duration: Longer than 54 minutes  Language: Georgian   ID: #[id number]   Name: [place]

## 2025-07-14 NOTE — PROGRESS NOTES
[address] [place] [address]   Office [phone], Fax [phone]    Subjective   Jaylene Tobin is a 29 y.o. female who presents for Amenorrhea.     Pt states that she wants her Nexplanon removed d/t weight gain and amenorrhea x 9 months. It was placed October 2024. She denies hx of migraines, VTE, smoking. Is interested in other methods of birth control.     Review of Systems   Pertinent negatives and positives noted above in HPI.     Medical History  Past Medical History:   Diagnosis Date    Mental disorder        Medications  Current Outpatient Medications   Medication Sig    drospirenone-ethinyl estradiol (MERA) 3-0.02 MG per tablet Take 1 tablet by mouth daily    FLUoxetine (PROZAC) 40 MG capsule TAKE 1 CAPSULE BY MOUTH EVERY DAY (Patient not taking: Reported on 7/14/2025)    ferrous sulfate (IRON 325) 325 (65 Fe) MG tablet Take 1 tablet by mouth daily (with breakfast) (Patient not taking: Reported on 7/14/2025)    mineral oil-hydrophilic petrolatum (AQUAPHOR) ointment Apply topically as needed. (Patient not taking: Reported on 7/14/2025)     No current facility-administered medications for this visit.       Immunizations   Immunization History   Administered Date(s) Administered    Hep B, ENGERIX-B, (age 20y+), IM, 1mL 05/08/2024, 06/24/2024    Influenza, FLUCELVAX, (age 6 mo+) IM, Trivalent PF, 0.5mL 10/21/2024    Influenza, FLUCELVAX, (age 6 mo+), MDCK, Quadv PF, 0.5mL 02/16/2024    TDaP, ADACEL (age 10y-64y), BOOSTRIX (age 10y+), IM, 0.5mL 07/26/2024       Allergies   No Known Allergies    Objective   Vital Signs  /75 (BP Site: Left Upper Arm, Patient Position: Sitting, BP Cuff Size: Medium Adult)   Pulse 71   Temp 98.4 °F (36.9 °C) (Oral)   Wt 87 kg (191 lb 12.8 oz)   SpO2 98%   BMI 38.74 kg/m²     Physical Examination  Physical Exam  Vitals reviewed.   Constitutional:       General: She is not in acute distress.     Appearance: Normal appearance.   HENT:      Head:

## 2025-07-14 NOTE — PROGRESS NOTES
Identified pt with two pt identifiers(name and ). Reviewed record in preparation for visit and have obtained necessary documentation.  Chief Complaint   Patient presents with    Amenorrhea     Pt reports feeling depressed, gaining weight and amenorrhea since nexplanon placed in 10/2024. Would like to know what her options are for replacement.        Vitals:    25 1404   BP: 118/75   BP Site: Left Upper Arm   Patient Position: Sitting   BP Cuff Size: Medium Adult   Pulse: 71   Temp: 98.4 °F (36.9 °C)   TempSrc: Oral   SpO2: 98%   Weight: 87 kg (191 lb 12.8 oz)         Coordination of Care Questionnaire:  :     \"Have you been to the ER, urgent care clinic since your last visit?  Hospitalized since your last visit?\"    NO    “Have you seen or consulted any other health care providers outside of [facility] since your last visit?”    NO            Click Here for Release of Records Request

## 2025-08-14 ENCOUNTER — OFFICE VISIT (OUTPATIENT)
Age: 30
End: 2025-08-14

## 2025-08-14 DIAGNOSIS — Z78.9 NEEDS ASSISTANCE WITH COMMUNITY RESOURCES: Primary | ICD-10-CM

## 2025-08-14 DIAGNOSIS — Z78.9 NEED FOR FOLLOW-UP BY SOCIAL WORKER: ICD-10-CM
